# Patient Record
Sex: FEMALE | Race: WHITE | Employment: OTHER | URBAN - NONMETROPOLITAN AREA
[De-identification: names, ages, dates, MRNs, and addresses within clinical notes are randomized per-mention and may not be internally consistent; named-entity substitution may affect disease eponyms.]

---

## 2017-11-27 ENCOUNTER — HOSPITAL ENCOUNTER (OUTPATIENT)
Dept: WOMENS IMAGING | Age: 67
Discharge: HOME | End: 2017-11-27
Payer: MEDICARE

## 2017-11-27 DIAGNOSIS — Z13.9 VISIT FOR SCREENING: ICD-10-CM

## 2017-11-27 PROCEDURE — G0202 SCR MAMMO BI INCL CAD: HCPCS

## 2017-11-29 ENCOUNTER — HOSPITAL ENCOUNTER (OUTPATIENT)
Dept: WOMENS IMAGING | Age: 67
Discharge: HOME | End: 2017-11-29
Payer: MEDICARE

## 2017-11-29 ENCOUNTER — APPOINTMENT (OUTPATIENT)
Dept: WOMENS IMAGING | Age: 67
End: 2017-11-29
Payer: MEDICARE

## 2017-11-29 DIAGNOSIS — R92.2 BREAST DENSITY: ICD-10-CM

## 2017-11-29 PROCEDURE — G0279 TOMOSYNTHESIS, MAMMO: HCPCS

## 2018-01-09 ENCOUNTER — HOSPITAL ENCOUNTER (OUTPATIENT)
Dept: ULTRASOUND IMAGING | Age: 68
Discharge: HOME | End: 2018-01-09
Payer: MEDICARE

## 2018-01-09 DIAGNOSIS — R52 PAIN: ICD-10-CM

## 2018-01-09 PROCEDURE — 76856 US EXAM PELVIC COMPLETE: CPT

## 2018-05-30 ENCOUNTER — HOSPITAL ENCOUNTER (OUTPATIENT)
Dept: WOMENS IMAGING | Age: 68
Discharge: HOME | End: 2018-05-30
Payer: MEDICARE

## 2018-05-30 DIAGNOSIS — R92.2 BREAST DENSITY: ICD-10-CM

## 2018-05-30 PROCEDURE — G0279 TOMOSYNTHESIS, MAMMO: HCPCS

## 2018-11-28 ENCOUNTER — HOSPITAL ENCOUNTER (OUTPATIENT)
Dept: WOMENS IMAGING | Age: 68
Discharge: HOME | End: 2018-11-28
Payer: MEDICARE

## 2018-11-28 DIAGNOSIS — Z12.31 VISIT FOR SCREENING MAMMOGRAM: ICD-10-CM

## 2018-11-28 PROCEDURE — 77063 BREAST TOMOSYNTHESIS BI: CPT

## 2019-04-19 ENCOUNTER — HOSPITAL ENCOUNTER (OUTPATIENT)
Dept: CT IMAGING | Age: 69
Discharge: HOME | End: 2019-04-19
Payer: MEDICARE

## 2019-04-19 DIAGNOSIS — N02.9 IDIOPATHIC HEMATURIA, UNSPECIFIED WHETHER GLOMERULAR MORPHOLOGIC CHANGES PRESENT: ICD-10-CM

## 2019-04-19 PROCEDURE — 74176 CT ABD & PELVIS W/O CONTRAST: CPT

## 2019-11-29 ENCOUNTER — HOSPITAL ENCOUNTER (OUTPATIENT)
Dept: MAMMOGRAPHY | Age: 69
Discharge: HOME | End: 2019-11-29
Payer: MEDICARE

## 2019-11-29 DIAGNOSIS — Z12.39 BREAST SCREENING: ICD-10-CM

## 2019-11-29 PROCEDURE — 77063 BREAST TOMOSYNTHESIS BI: CPT

## 2020-01-20 ENCOUNTER — HOSPITAL ENCOUNTER (OUTPATIENT)
Age: 70
Discharge: HOME | End: 2020-01-20
Payer: MEDICARE

## 2020-01-20 LAB
ANION GAP SERPL CALCULATED.3IONS-SCNC: 11 MEQ/L (ref 8–16)
BUN BLDV-MCNC: 9 MG/DL (ref 7–22)
CALCIUM SERPL-MCNC: 9.2 MG/DL (ref 8.5–10.5)
CHLORIDE BLD-SCNC: 95 MEQ/L (ref 98–111)
CO2: 28 MEQ/L (ref 23–33)
CREAT SERPL-MCNC: 0.7 MG/DL (ref 0.4–1.2)
EKG ATRIAL RATE: 73 BPM
EKG P AXIS: 51 DEGREES
EKG P-R INTERVAL: 150 MS
EKG Q-T INTERVAL: 390 MS
EKG QRS DURATION: 80 MS
EKG QTC CALCULATION (BAZETT): 429 MS
EKG R AXIS: 39 DEGREES
EKG T AXIS: 73 DEGREES
EKG VENTRICULAR RATE: 73 BPM
ERYTHROCYTE [DISTWIDTH] IN BLOOD BY AUTOMATED COUNT: 13 % (ref 11.5–14.5)
ERYTHROCYTE [DISTWIDTH] IN BLOOD BY AUTOMATED COUNT: 46.9 FL (ref 35–45)
GFR SERPL CREATININE-BSD FRML MDRD: 82 ML/MIN/1.73M2
GLUCOSE BLD-MCNC: 93 MG/DL (ref 70–108)
HCT VFR BLD CALC: 42.1 % (ref 37–47)
HEMOGLOBIN: 13.1 GM/DL (ref 12–16)
MCH RBC QN AUTO: 30.7 PG (ref 26–33)
MCHC RBC AUTO-ENTMCNC: 31.1 GM/DL (ref 32.2–35.5)
MCV RBC AUTO: 98.6 FL (ref 81–99)
PLATELET # BLD: 250 THOU/MM3 (ref 130–400)
PMV BLD AUTO: 11.5 FL (ref 9.4–12.4)
POTASSIUM SERPL-SCNC: 4.3 MEQ/L (ref 3.5–5.2)
RBC # BLD: 4.27 MILL/MM3 (ref 4.2–5.4)
SODIUM BLD-SCNC: 134 MEQ/L (ref 135–145)
WBC # BLD: 7.4 THOU/MM3 (ref 4.8–10.8)

## 2020-01-20 PROCEDURE — 36415 COLL VENOUS BLD VENIPUNCTURE: CPT

## 2020-01-20 PROCEDURE — 85027 COMPLETE CBC AUTOMATED: CPT

## 2020-01-20 PROCEDURE — 80048 BASIC METABOLIC PNL TOTAL CA: CPT

## 2020-01-20 PROCEDURE — 93005 ELECTROCARDIOGRAM TRACING: CPT | Performed by: PHYSICIAN ASSISTANT

## 2020-02-10 ENCOUNTER — HOSPITAL ENCOUNTER (OUTPATIENT)
Age: 70
Setting detail: OUTPATIENT SURGERY
Discharge: HOME | End: 2020-02-10
Attending: SPECIALIST | Admitting: SPECIALIST
Payer: MEDICARE

## 2020-02-10 ENCOUNTER — ANESTHESIA EVENT (OUTPATIENT)
Dept: OPERATING ROOM | Age: 70
End: 2020-02-10
Payer: MEDICARE

## 2020-02-10 ENCOUNTER — ANESTHESIA (OUTPATIENT)
Dept: OPERATING ROOM | Age: 70
End: 2020-02-10
Payer: MEDICARE

## 2020-02-10 VITALS
HEIGHT: 61 IN | RESPIRATION RATE: 16 BRPM | DIASTOLIC BLOOD PRESSURE: 62 MMHG | BODY MASS INDEX: 32.28 KG/M2 | TEMPERATURE: 97 F | OXYGEN SATURATION: 99 % | SYSTOLIC BLOOD PRESSURE: 139 MMHG | WEIGHT: 171 LBS | HEART RATE: 70 BPM

## 2020-02-10 VITALS
RESPIRATION RATE: 16 BRPM | OXYGEN SATURATION: 99 % | SYSTOLIC BLOOD PRESSURE: 131 MMHG | DIASTOLIC BLOOD PRESSURE: 62 MMHG

## 2020-02-10 PROCEDURE — 2500000003 HC RX 250 WO HCPCS: Performed by: SPECIALIST

## 2020-02-10 PROCEDURE — 2500000003 HC RX 250 WO HCPCS: Performed by: NURSE ANESTHETIST, CERTIFIED REGISTERED

## 2020-02-10 PROCEDURE — 6360000002 HC RX W HCPCS: Performed by: SPECIALIST

## 2020-02-10 PROCEDURE — 3600000013 HC SURGERY LEVEL 3 ADDTL 15MIN: Performed by: SPECIALIST

## 2020-02-10 PROCEDURE — 3700000000 HC ANESTHESIA ATTENDED CARE: Performed by: SPECIALIST

## 2020-02-10 PROCEDURE — 7100000011 HC PHASE II RECOVERY - ADDTL 15 MIN: Performed by: SPECIALIST

## 2020-02-10 PROCEDURE — 2580000003 HC RX 258: Performed by: SPECIALIST

## 2020-02-10 PROCEDURE — 7100000010 HC PHASE II RECOVERY - FIRST 15 MIN: Performed by: SPECIALIST

## 2020-02-10 PROCEDURE — 3600000003 HC SURGERY LEVEL 3 BASE: Performed by: SPECIALIST

## 2020-02-10 PROCEDURE — 88305 TISSUE EXAM BY PATHOLOGIST: CPT

## 2020-02-10 PROCEDURE — 88331 PATH CONSLTJ SURG 1 BLK 1SPC: CPT

## 2020-02-10 PROCEDURE — 3700000001 HC ADD 15 MINUTES (ANESTHESIA): Performed by: SPECIALIST

## 2020-02-10 RX ORDER — SODIUM CHLORIDE 9 MG/ML
INJECTION, SOLUTION INTRAVENOUS CONTINUOUS
Status: DISCONTINUED | OUTPATIENT
Start: 2020-02-10 | End: 2020-02-10 | Stop reason: HOSPADM

## 2020-02-10 RX ORDER — LIDOCAINE HYDROCHLORIDE 20 MG/ML
INJECTION, SOLUTION EPIDURAL; INFILTRATION; INTRACAUDAL; PERINEURAL PRN
Status: DISCONTINUED | OUTPATIENT
Start: 2020-02-10 | End: 2020-02-10 | Stop reason: SDUPTHER

## 2020-02-10 RX ORDER — PROPOFOL 10 MG/ML
INJECTION, EMULSION INTRAVENOUS CONTINUOUS PRN
Status: DISCONTINUED | OUTPATIENT
Start: 2020-02-10 | End: 2020-02-10 | Stop reason: SDUPTHER

## 2020-02-10 RX ORDER — LIDOCAINE HYDROCHLORIDE AND EPINEPHRINE 10; 10 MG/ML; UG/ML
INJECTION, SOLUTION INFILTRATION; PERINEURAL PRN
Status: DISCONTINUED | OUTPATIENT
Start: 2020-02-10 | End: 2020-02-10 | Stop reason: ALTCHOICE

## 2020-02-10 RX ADMIN — CEFAZOLIN 2 G: 10 INJECTION, POWDER, FOR SOLUTION INTRAVENOUS at 09:45

## 2020-02-10 RX ADMIN — LIDOCAINE HYDROCHLORIDE 40 MG: 20 INJECTION, SOLUTION EPIDURAL; INFILTRATION; INTRACAUDAL; PERINEURAL at 09:55

## 2020-02-10 RX ADMIN — SODIUM CHLORIDE: 9 INJECTION, SOLUTION INTRAVENOUS at 09:45

## 2020-02-10 RX ADMIN — PROPOFOL 100 MCG/KG/MIN: 10 INJECTION, EMULSION INTRAVENOUS at 09:45

## 2020-02-10 RX ADMIN — LIDOCAINE HYDROCHLORIDE 40 MG: 20 INJECTION, SOLUTION EPIDURAL; INFILTRATION; INTRACAUDAL; PERINEURAL at 09:45

## 2020-02-10 ASSESSMENT — PULMONARY FUNCTION TESTS
PIF_VALUE: 0

## 2020-02-10 ASSESSMENT — PAIN SCALES - GENERAL: PAINLEVEL_OUTOF10: 0

## 2020-02-10 ASSESSMENT — PAIN - FUNCTIONAL ASSESSMENT: PAIN_FUNCTIONAL_ASSESSMENT: 0-10

## 2020-02-10 NOTE — H&P
[address] [id number]                       PREOPERATIVE HISTORY AND PHYSICAL    PATIENT NAME: Messi Mckeon             :        1950  MED REC NO:   [id number]                           ROOM:  ACCOUNT NO:   [de-identified]                           ADMIT DATE: 02/10/2020  PROVIDER:     TIM Neville ATTENDING PROVIDER:  Cheko Bedolla. Rosenda Glasgow MD    HISTORY OF PRESENT ILLNESS:  The patient is a 77-year-old female who was  referred to our office by Dr. Danny Pink. 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  This lesion has been present  since 2019 and has been gradually enlarging prior to the biopsy. She  was referred to our office for definitive surgical intervention. ALLERGIES:  LIPITOR, PERCOCET, TYLOX. PAST MEDICAL HISTORY:  Hypertension, hyperlipidemia, skin cancer. PAST SURGICAL HISTORY:  Hysterectomy and previous skin cancer excisions. SOCIAL HISTORY:  She denies any tobacco use. MEDICATIONS:  Zoloft, potassium, calcium, Tylenol, ferrous sulfate,  multivitamin, simvastatin, Prinivil. REVIEW OF SYSTEMS:  CONSTITUTIONAL:  Denies any fatigue or fever. GASTROINTESTINAL:  Denies any nausea or vomiting. CARDIOVASCULAR:  Denies any chest pain. INTEGUMENTARY:  Reports the lesion on her right mid chest.    PHYSICAL EXAMINATION:  GENERAL:  The patient is a pleasant,  well-developed, well-nourished,  alert and oriented x1 77-year-old female who is in no acute distress. VITAL SIGNS:  She is 5 feet 1 inch tall, weighs 170 pounds, body mass  index 32.2. HEART:  Auscultation of her heart reveals regular rate and rhythm. No  murmur. LUNGS:  Auscultation of her lungs show that they are clear throughout  all lung fields.   INTEGUMENTARY:  Reveals 9 mm biopsy site over the right mid chest.    DIAGNOSIS:  Squamous cell carcinoma of the right mid chest.    PLAN:  We discussed with the patient the nature of her problem. I  discussed with her I would recommend definitive surgical intervention to  excise the squamous cell carcinoma. Frozen section, pathologic review,  would be obtained. Definitive closure would be made after the skin  cancer was excised. I discussed the operative procedure as well as  postop recovery with her. All of her questions have been answered to  her satisfaction. An operative date of 02/10/2020 has been chosen at  [address] [place] [address].         TIM Mello    D: 02/10/2020 7:19:34       T: 02/10/2020 7:57:47     [email or web address]  Job#: [id number]     Doc#: [id number]    CC:

## 2020-02-10 NOTE — ANESTHESIA PRE PROCEDURE
Department of Anesthesiology  Preprocedure Note       Name:  Hugo Piper   Age:  68 y.o.  :  1950                                          MRN:  [id number]         Date:  2/10/2020      Surgeon: Melia Valero):  Cheryl Dodge MD    Procedure: EXCISION SCC RIGHT MID CHEST (Right )    Medications prior to admission:   Prior to Admission medications    Medication Sig Start Date End Date Taking? Authorizing Provider   sertraline (ZOLOFT) 25 MG tablet Take 25 mg by mouth daily   Yes Historical Provider, MD   Potassium 99 MG TABS Take 1 tablet by mouth daily   Yes Historical Provider, MD   Calcium Carbonate-Vitamin D (CALCIUM 600+D HIGH POTENCY PO) Take 1 tablet by mouth daily   Yes Historical Provider, MD   acetaminophen (APAP EXTRA STRENGTH) 500 MG tablet Take 2 tablets by mouth every 6 hours as needed for Pain 16  Yes VASU Casper - CNP   simvastatin (ZOCOR) 20 MG tablet Take 20 mg by mouth nightly Stopped last week because of muscle weakness   Yes Historical Provider, MD   lisinopril (PRINIVIL;ZESTRIL) 10 MG tablet Take 10 mg by mouth daily. Yes Historical Provider, MD   ferrous sulfate 325 (65 FE) MG tablet Take 325 mg by mouth daily (with breakfast). Historical Provider, MD   Multiple Vitamin (MULTI-VITAMIN PO) Take  by mouth. Historical Provider, MD       Current medications:    Current Facility-Administered Medications   Medication Dose Route Frequency Provider Last Rate Last Dose    0.9 % sodium chloride infusion   Intravenous Continuous Cheryl Dodge MD        ceFAZolin (ANCEF) 2 g in dextrose 5 % 50 mL IVPB  2 g Intravenous On Call to 56 Ladi Arce MD           Allergies:     Allergies   Allergen Reactions    Lipitor Other (See Comments)     WEAK MUSCLES    Oxycodone-Acetaminophen Other (See Comments)     HALLUCINATIONS    Tylox [Oxycodone-Acetaminophen] Other (See Comments)     hallucinations       Problem List:    Patient Active Problem List   Diagnosis 08/09/2014       POC Tests: No results for input(s): POCGLU, POCNA, POCK, POCCL, POCBUN, POCHEMO, POCHCT in the last 72 hours. Coags: No results found for: PROTIME, INR, APTT    HCG (If Applicable): No results found for: PREGTESTUR, PREGSERUM, HCG, HCGQUANT     ABGs: No results found for: PHART, PO2ART, WHO7QTG, PZI0UBB, BEART, A4PUIDJY     Type & Screen (If Applicable):  No results found for: LABABO, [address]    Anesthesia Evaluation  Patient summary reviewed no history of anesthetic complications:   Airway: Mallampati: II  TM distance: >3 FB   Neck ROM: full  Mouth opening: > = 3 FB Dental:          Pulmonary:Negative Pulmonary ROS and normal exam                               Cardiovascular:    (+) hypertension:, hyperlipidemia                  Neuro/Psych:   Negative Neuro/Psych ROS              GI/Hepatic/Renal: Neg GI/Hepatic/Renal ROS            Endo/Other:    (+) malignancy/cancer. Abdominal:           Vascular: negative vascular ROS. Anesthesia Plan      MAC     ASA 2       Induction: intravenous. Anesthetic plan and risks discussed with patient.       Plan discussed with CRNA and surgical team.                  Nakita Olmos MD   2/10/2020

## 2020-02-27 ENCOUNTER — HOSPITAL ENCOUNTER (OUTPATIENT)
Age: 70
Discharge: HOME | End: 2020-02-27
Payer: MEDICARE

## 2020-02-27 ENCOUNTER — HOSPITAL ENCOUNTER (OUTPATIENT)
Dept: GENERAL RADIOLOGY | Age: 70
Discharge: HOME | End: 2020-02-27
Payer: MEDICARE

## 2020-02-27 PROCEDURE — 73564 X-RAY EXAM KNEE 4 OR MORE: CPT

## 2020-03-04 ENCOUNTER — HOSPITAL ENCOUNTER (OUTPATIENT)
Dept: PHYSICAL THERAPY | Age: 70
Setting detail: THERAPIES SERIES
Discharge: HOME | End: 2020-03-04
Payer: MEDICARE

## 2020-03-04 PROCEDURE — 97161 PT EVAL LOW COMPLEX 20 MIN: CPT

## 2020-03-04 PROCEDURE — 97035 APP MDLTY 1+ULTRASOUND EA 15: CPT

## 2020-03-04 PROCEDURE — 97110 THERAPEUTIC EXERCISES: CPT

## 2020-03-04 ASSESSMENT — PAIN DESCRIPTION - ORIENTATION: ORIENTATION: RIGHT

## 2020-03-04 ASSESSMENT — PAIN DESCRIPTION - LOCATION: LOCATION: KNEE

## 2020-03-04 ASSESSMENT — PAIN SCALES - GENERAL: PAINLEVEL_OUTOF10: 7

## 2020-03-04 NOTE — FLOWSHEET NOTE
** PLEASE SIGN, DATE AND TIME CERTIFICATION BELOW AND RETURN TO [facility] OUTPATIENT REHABILITATION (FAX #: [phone]). ATTEST/CO-SIGN IF ACCESSING VIA [employer]. THANK YOU.**    I certify that I have examined the patient below and determined that Physical Medicine and Rehabilitation service is necessary and that I approve the established plan of care for up to 90 days or as specifically noted. Attestation, signature or co-signature of physician indicates approval of certification requirements.    ________________________ ____________ __________  Physician Signature   Date   Time       [address]     Time In: 08  Time Out: 6314  Minutes: 50  Timed Code Treatment Minutes: 10 Minutes           AROM R knee 3- 125 degrees    Date: 3/4/2020  Patient Name: Ana Norton,  Gender:  female        CSN: [id number]   : 1950  (68 y.o.)  Referral Date : 20     Referring Practitioner: Dr. George Harding      Diagnosis: R knee pain  Treatment Diagnosis: R knee pain, difficulty walking  Additional Pertinent Hx: see medical history quesionnaire , reviewed meds and allergies       General:  PT Visit Information  Onset Date: 20  PT Insurance Information: Medicare- pays at 80%, modalities covered except ionto/MHP/CP not covered. Secondary: Aetna  Total # of Visits Approved: 10  Total # of Visits to Date: 1  Plan of Care/Certification Expiration Date: 04/10/20  Progress Note Due Date: 20  Progress Note Counter: 1/10                Position Activity Restriction  Other position/activity restrictions: none       See Medical History Questionnaire for information related to allergies and medications. Subjective:  Chart Reviewed: Yes  Patient assessed for rehabilitation services?: Yes  Response To Previous Treatment: Not applicable  Family / Caregiver Present: No  Comments: 1/10 for PN. cert due .   f/u with MD 3/26     Subjective: patient reports Novemeber 2019, kneeling to get into bed and felt severe, sharp pain medial R knee. Pain since then has been present and mentioned at wellness check on 2/27, x-ray were ordered, PT ordered. Pain:  Patient Currently in Pain: Yes  Pain Assessment: 0-10  Pain Level: 7  Pain Location: Knee  Pain Orientation: Right  Pain Radiating Towards: medial R knee and posterior knee 100% of the day, high 7/10 , average 4/10 with fast walking     Social/Functional History:    Type of Home: House  Home Layout: One level  Home Access: Stairs to enter with rails  Entrance Stairs - Number of Steps: 2  Entrance Stairs - Rails: Right  Home Equipment: Rolling walker, Cane(does not use assistive device)     Bathroom Shower/Tub: Walk-in shower  Bathroom Equipment: Grab bars around toilet  IADL Comments: has to work slower at [employer], difficulty walking uneven surface. pain sit to stand 2/10       ADL Assistance: [address] [place] [address] Avenue: Independent  Homemaking Responsibilities: Yes  Ambulation Assistance: Independent  Transfer Assistance: Independent    Active : Yes  Mode of Transportation: Car  Occupation: Retired  Leisure & Hobbies: enjoys reading.   helping with brother in nursing home- brings him to /from appointments, does not have to assist with transfers and walking  Additional Comments: increased R knee pain with walking from car to store at fast pace, difficulty going up and down steps at [employer], has to walk slower pace    Objective  Overall Orientation Status: Within Normal Limits             Vision: Within Functional Limits    Hearing: Within functional limits      Observation/Palpation  Posture: Fair                                                   ROM RLE: AROM R hip flexion 60, abduction 10, knee 3- 125 degrees  ROM LLE: AROM L hip flexion 70, abduction 15, knee 0- 135 degrees    Strength RLE: Exception  R Hip Flexion: 3/5  R Hip

## 2020-03-11 ENCOUNTER — HOSPITAL ENCOUNTER (OUTPATIENT)
Dept: PHYSICAL THERAPY | Age: 70
Setting detail: THERAPIES SERIES
Discharge: HOME | End: 2020-03-11
Payer: MEDICARE

## 2020-03-11 PROCEDURE — 97110 THERAPEUTIC EXERCISES: CPT

## 2020-03-11 NOTE — PROGRESS NOTES
[address] PHYSICAL THERAPY  DAILY NOTE  [address] [facility].     Time In: 3444  Time Out: 0932  Minutes: 25  Timed Code Treatment Minutes: 20 Minutes                Date: 3/11/2020  Patient Name: Cayla Tavera,  Gender:  female        CSN: [id number]   : 1950  (68 y.o.)  Referral Date : 20    Referring Practitioner: Dr. Dom Guillaume      Diagnosis: R knee pain  Treatment Diagnosis: R knee pain, difficulty walking   Additional Pertinent Hx: see medical history quesionnaire , reviewed meds and allergies                  General:  PT Visit Information  Onset Date: 20  PT Insurance Information: Medicare- pays at 80%, modalities covered except ionto/MHP/CP not covered. Secondary: Aetna  Total # of Visits Approved: 10  Total # of Visits to Date: 2  Plan of Care/Certification Expiration Date: 04/10/20  Progress Note Counter: 2/10 for PN. Subjective:  Family / Caregiver Present: No  Comments: 1/10 for PN. cert due . f/u with MD 3/26     Subjective: Patient reporting having no pain today and reporting steps still bother good. Pain:  Patient Currently in Pain: No         Objective  Exercises  Exercise 1: R LE exercises  Exercise 2: heel slides x 10 - denies increase in pain  Exercise 3: quad set pillow under knee 15 x 5 seconds  Exercise 4: SAQ 10 x 5 seconds   Exercise 5: calf stretch with strap 10-15 seconds x 5 on Right   Exercise 6: SLR 10 x 5 seconds   Exercise 7: LAQ 10 x 5 seconds   Exercise 8: knee flexion EOB 10 x 5 seconds  Exercise 9: seated hamstring stretch 10-15 seconds x 5   Exercise 10: phonophoresis 50% x 5 minutes to medial knee, pillow under knee 2.5% hydrocortisone cream         Activity Tolerance:  Activity Tolerance: Patient Tolerated treatment well    Assessment:   Body structures, Functions, Activity limitations: Decreased ROM, Decreased strength  Assessment: Made progressions as noted with patient having less pain today. Patient reporting only minimal increase in soreness at end of session but having no other complains. Prognosis: Excellent       Patient Education:continue with HEP and monitor response to progressions. Plan:  Times per week: 2 x per week  Plan weeks: 4 weeks  Specific instructions for Next Treatment: Bike/NuStep, AROM, strengthening, stretching, avoid increased medial knee pain, progress to standing, total gym as tolerated.   phonophoresis medial R knee prn  Current Treatment Recommendations: Strengthening, ROM, Home Exercise Program, Patient/Caregiver Education & Training    Goals:  Patient goals : to get my knee feeling better    Short term goals  Time Frame for Short term goals: deferred to [facility]'s    Long term goals  Time Frame for Long term goals : 5 weeks   Long term goal 1: increased AROM R hip flexion 70, abduction 15, knee 0- 135 degrees to allow patient to report able to sit to stand with no pain  Long term goal 2: increase strength R LE to 4-/5 to allow patient to walk fast in/out of store with decreased R knee pain to 1/10  Long term goal 3: I with HEP as prescribed to allow patient to report able to walk x 30 minutes at store with decreased R knee pain to 1/10    Nimisha Holbrook, [id number]

## 2020-03-13 ENCOUNTER — HOSPITAL ENCOUNTER (OUTPATIENT)
Dept: PHYSICAL THERAPY | Age: 70
Setting detail: THERAPIES SERIES
Discharge: HOME | End: 2020-03-13
Payer: MEDICARE

## 2020-03-13 PROCEDURE — 97110 THERAPEUTIC EXERCISES: CPT

## 2020-03-13 NOTE — PROGRESS NOTES
[email or web address] PHYSICAL THERAPY  DAILY NOTE  [address] [facility].     Time In: 6241  Time Out: 09  Minutes: 30  Timed Code Treatment Minutes: 30 Minutes                Date: 3/13/2020  Patient Name: Julian Wood,  Gender:  female        CSN: [id number]   : 1950  (68 y.o.)  Referral Date : 20    Referring Practitioner: Dr. Marilyn Cardoso      Diagnosis: R knee pain  Treatment Diagnosis: R knee pain, difficulty walking   Additional Pertinent Hx: see medical history quesionnaire , reviewed meds and allergies                  General:  PT Visit Information  Onset Date: 20  PT Insurance Information: Medicare- pays at 80%, modalities covered except ionto/MHP/CP not covered. Secondary: Aetna  Total # of Visits Approved: 10  Total # of Visits to Date: 3  Plan of Care/Certification Expiration Date: 04/10/20  Progress Note Counter: 3/10 for PN. Subjective:  Family / Caregiver Present: No  Comments: 1/10 for PN. cert due . f/u with MD 3/26     Subjective: No pain today but did have pain yesterday when walking around mall. Pain:  Patient Currently in Pain: No         Objective  Exercises  Exercise 1: Bike seat 3 x 3 minutes -no inceace in pain  Exercise 2: heel slides x 15 - denies increase in pain  Exercise 3: quad set pillow under knee 15 x 5 seconds  Exercise 4: SAQ 15 x 5 seconds   Exercise 5: calf stretch with strap 10-15 seconds x 5 on Right   Exercise 6: SLR 10 x 5 seconds   Exercise 7: LAQ 15 x 5 seconds   Exercise 8: knee flexion EOB 10 x 5 seconds  Exercise 9: seated hamstring stretch 10-15 seconds x 5   Exercise 10: 3 way hip x 10 bilat   Exercise 11: total gym x 15          Activity Tolerance:  Activity Tolerance: Patient Tolerated treatment well    Assessment:   Body structures, Functions, Activity limitations: Decreased ROM, Decreased strength  Assessment: Progressed with reps as noted with patient tolerating well but reporting a little more knee soreness at end of session but having no other complains. Prognosis: Excellent       Patient Education:monitor response to progressions. Plan:  Times per week: 2 x per week  Plan weeks: 4 weeks  Specific instructions for Next Treatment: Bike/NuStep, AROM, strengthening, stretching, avoid increased medial knee pain, progress to standing, total gym as tolerated.   phonophoresis medial R knee prn  Current Treatment Recommendations: Strengthening, ROM, Home Exercise Program, Patient/Caregiver Education & Training    Goals:  Patient goals : to get my knee feeling better    Short term goals  Time Frame for Short term goals: deferred to LTG's    Long term goals  Time Frame for Long term goals : 5 weeks   Long term goal 1: increased AROM R hip flexion 70, abduction 15, knee 0- 135 degrees to allow patient to report able to sit to stand with no pain  Long term goal 2: increase strength R LE to 4-/5 to allow patient to walk fast in/out of store with decreased R knee pain to 1/10  Long term goal 3: I with HEP as prescribed to allow patient to report able to walk x 30 minutes at store with decreased R knee pain to 1/10    Benton Strickland, [id number]

## 2020-03-18 ENCOUNTER — APPOINTMENT (OUTPATIENT)
Dept: PHYSICAL THERAPY | Age: 70
End: 2020-03-18
Payer: MEDICARE

## 2020-03-20 ENCOUNTER — APPOINTMENT (OUTPATIENT)
Dept: PHYSICAL THERAPY | Age: 70
End: 2020-03-20
Payer: MEDICARE

## 2020-03-25 ENCOUNTER — APPOINTMENT (OUTPATIENT)
Dept: PHYSICAL THERAPY | Age: 70
End: 2020-03-25
Payer: MEDICARE

## 2020-03-27 ENCOUNTER — APPOINTMENT (OUTPATIENT)
Dept: PHYSICAL THERAPY | Age: 70
End: 2020-03-27
Payer: MEDICARE

## 2020-06-04 ENCOUNTER — HOSPITAL ENCOUNTER (OUTPATIENT)
Dept: PHYSICAL THERAPY | Age: 70
Setting detail: THERAPIES SERIES
Discharge: HOME | End: 2020-06-04
Payer: MEDICARE

## 2020-06-04 NOTE — DISCHARGE SUMMARY
[facility] NOTE  OUTPATIENT  [address] [facility].    Patient Name: Avery Lopez        CSN: [id number]   YOB: 1950  Gender: female          Patient is discharged from Physical Therapy services at this time. See last note for details related to results of therapy and goal achievement. Reason for discharge: was cancelled per hospital COVID restrictions, called to see if would like to return and patient would like to be discharged. Ayesha Drummond.  Calvin Segovia # [id number]

## 2020-09-17 ENCOUNTER — HOSPITAL ENCOUNTER (OUTPATIENT)
Dept: PHYSICAL THERAPY | Age: 70
Setting detail: THERAPIES SERIES
Discharge: HOME | End: 2020-09-17
Payer: MEDICARE

## 2020-09-17 PROCEDURE — 97161 PT EVAL LOW COMPLEX 20 MIN: CPT

## 2020-09-17 PROCEDURE — 97110 THERAPEUTIC EXERCISES: CPT

## 2020-09-17 NOTE — FLOWSHEET NOTE
** PLEASE SIGN, DATE AND TIME CERTIFICATION BELOW AND RETURN TO [facility] OUTPATIENT REHABILITATION (FAX #: [phone]). ATTEST/CO-SIGN IF ACCESSING VIA [employer]. THANK YOU.**    I certify that I have examined the patient below and determined that Physical Medicine and Rehabilitation service is necessary and that I approve the established plan of care for up to 90 days or as specifically noted. Attestation, signature or co-signature of physician indicates approval of certification requirements.    ________________________ ____________ __________  Physician Signature   Date   Time   [address] [facility]  PHYSICAL THERAPY  [x] EVALUATION  [] DAILY NOTE (LAND) [] DAILY NOTE (AQUATIC ) [] PROGRESS NOTE [] DISCHARGE NOTE    [] [facility]   [] [facility] 90    [] [facility]   [] [facility]    Date: 2020  Patient Name:  Negar Fulton  : 1950  MRN: [id number]    Referring Practitioner Marychuy Aquino MD   Diagnosis Other tear of medial meniscus, current injury, right knee, subsequent encounter [I50.570N]    Treatment Diagnosis R knee pain, difficulty walking   Date of Evaluation 20   Additional Pertinent History See below      Functional Outcome Measure Used LEFS   Functional Outcome Score  (20) eval score 8      Insurance: Primary: Payor: MEDICARE /  /  / ,   Secondary: Danielle Agosto Information: Medicare- pays at 80%, modalities covered except ionto/MHP/CP not covered. Visit # 1, 1/10 for progress note   Visits Allowed: Unlimited visits   Recertification Date:    Physician Follow-Up: 2020   Physician Orders: PT per protocol   History of Present Illness: See below     SUBJECTIVE: 2019 felt a pop in knee when getting into bed. Patient did OP PT in March but then pain continued.   Patient to Dr. Argelia Alberts in 2020, MRI, had R knee scope on 9/15/2020, home same day with AROM, stretching, strengthening R LE , progress to standing , step, total gym    Activity/Treatment Tolerance:  []  Patient tolerated treatment well  []  Patient limited by fatigue  [x]  Patient limited by pain   []  Patient limited by medical complications  []  Other:     Assessment: PT for AROM, strength, gait and balance s/p R knee scope. Body Structures/Functions/Activity Limitations: impaired ROM, impaired strength, pain and abnormal gait  Prognosis: good    GOALS:  Patient Goal: to get to walking without walker    Short Term Goals:  Time Frame: deferred to LTG's    Long Term Goals:  Time Frame: 5 weeks  1. Increase AROM R hip flexion 70, knee 0- 125 degrees to allow patient to walk and stand x 15 minutes for meal prep without assistive device  2. Increase strength R LE to 4-/5 to allow patient to go up and down 2 steps without assistive device  3. I with HEP as prescribed to allow patient to be I with dressing, showering    Patient Education:   [x]  HEP/Education Completed: Plan of Care, Goals, HEP handout of above exercises     []  No new Education completed  []  Reviewed Prior HEP      [x]  Patient verbalized and/or demonstrated understanding of education provided. []  Patient unable to verbalize and/or demonstrate understanding of education provided. Will continue education. [x]  Barriers to learning: NA    PLAN:  Treatment Recommendations: Strengthening, Range of Motion, Balance Training, Gait Training, Home Exercise Program and Patient Education    [x]  Plan of care initiated. Plan to see patient 2 times per week for 5 weeks to address the treatment planned outlined above.   []  Continue with current plan of care  []  Modify plan of care as follows:    []  Hold pending physician visit  []  Discharge    Time In 1310   Time Out 1400   Timed Code Minutes: 25 min   Total Treatment Time: 50  min       Electronically Signed by: Giselle Gamboa

## 2020-09-22 ENCOUNTER — HOSPITAL ENCOUNTER (OUTPATIENT)
Dept: PHYSICAL THERAPY | Age: 70
Setting detail: THERAPIES SERIES
Discharge: HOME | End: 2020-09-22
Payer: MEDICARE

## 2020-09-22 PROCEDURE — 97110 THERAPEUTIC EXERCISES: CPT

## 2020-09-22 NOTE — PROGRESS NOTES
Interventions Next Treatment: Bike, AROM, stretching, strengthening R LE , progress to standing , step, total gym    Activity/Treatment Tolerance:  [x]  Patient tolerated treatment well  []  Patient limited by fatigue  []  Patient limited by pain   []  Patient limited by medical complications  []  Other:     Assessment: Pt doing well after recent R knee surgery. Decreased pain after session. Pt to continue with HEP and ice for pain relief. Body Structures/Functions/Activity Limitations: impaired ROM, impaired strength, pain and abnormal gait  Prognosis: good    GOALS:  Patient Goal: to get to walking without walker    Short Term Goals:  Time Frame: deferred to LTG's    Long Term Goals:  Time Frame: 5 weeks  1. Increase AROM R hip flexion 70, knee 0- 125 degrees to allow patient to walk and stand x 15 minutes for meal prep without assistive device  2. Increase strength R LE to 4-/5 to allow patient to go up and down 2 steps without assistive device  3. I with HEP as prescribed to allow patient to be I with dressing, showering    Patient Education:   [x]  HEP/Education Completed: LAQ given to add to HEP     []  No new Education completed  []  Reviewed Prior HEP      [x]  Patient verbalized and/or demonstrated understanding of education provided. []  Patient unable to verbalize and/or demonstrate understanding of education provided. Will continue education. [x]  Barriers to learning: NA    PLAN:  Treatment Recommendations: Strengthening, Range of Motion, Balance Training, Gait Training, Home Exercise Program and Patient Education    []  Plan of care initiated. Plan to see patient 2 times per week for 5 weeks to address the treatment planned outlined above.   [x]  Continue with current plan of care  []  Modify plan of care as follows:    []  Hold pending physician visit  []  Discharge    Time In 1405   Time Out 1435   Timed Code Minutes: 30 min   Total Treatment Time: 30  min       Electronically Signed by: Adalid Lowe

## 2020-09-24 ENCOUNTER — HOSPITAL ENCOUNTER (OUTPATIENT)
Dept: PHYSICAL THERAPY | Age: 70
Setting detail: THERAPIES SERIES
Discharge: HOME | End: 2020-09-24
Payer: MEDICARE

## 2020-09-24 PROCEDURE — 97110 THERAPEUTIC EXERCISES: CPT

## 2020-09-24 NOTE — PROGRESS NOTES
[address] [facility]  PHYSICAL THERAPY  [] EVALUATION  [x] DAILY NOTE (LAND) [] DAILY NOTE (AQUATIC ) [] PROGRESS NOTE [] DISCHARGE NOTE    [] [address] [facility]   [x] [address]    [] [facility]   [] Jayson Quigley    Date: 2020  Patient Name:  Massiel Curtis  : 1950  MRN: [id number]    Referring Practitioner Lennox Los, MD   Diagnosis Other tear of medial meniscus, current injury, right knee, subsequent encounter [U19.805Q]    Treatment Diagnosis R knee pain, difficulty walking   Date of Evaluation 20   Additional Pertinent History See below      Functional Outcome Measure Used LEFS   Functional Outcome Score  (20) eval score 8      Insurance: Primary: Payor: MEDICARE /  /  / ,   Secondary: [facility] Information: Medicare- pays at 80%, modalities covered except ionto/MHP/CP not covered. Visit # 2, 2/10 for progress note   Visits Allowed: Unlimited visits   Recertification Date:    Physician Follow-Up: 2020   Physician Orders: PT per protocol   History of Present Illness: See below     SUBJECTIVE: Pt reporting of having a little bit of pain earlier today and did take a tylenol. Patient denies having any pain currently. TREATMENT   Precautions:    Pain: 5/10 lateral R knee    X in shaded column indicates activity completed today   Modalities Parameters/  Location  Notes                     Manual Therapy Time/Technique  Notes                     Exercise/Intervention   Notes   R heel slide with strap 15  x    Quad set towel roll under ankle 15 5 x -AROM R knee 0-132 degrees   SLR  15 5 x    SAQ 15 5 x    LAQ 15 5sec x                  NuStep seat 5, level 3 5min.       Hamstring, knee flexion and calf stretch at step 15sec 3x x    Standing: heel toe raises, marching, mini squats, 3 way hip 10x  x    4' step up forward lateral 20  x    Rocker board taps 15  x    Total Gym squats and heel raises 15  x      Specific Interventions Next Treatment: Bike, AROM, stretching, strengthening R LE , progress to standing , step, total gym    Activity/Treatment Tolerance:  [x]  Patient tolerated treatment well  []  Patient limited by fatigue  []  Patient limited by pain   []  Patient limited by medical complications  []  Other:     Assessment: Able to make progressions with patient tolerating very well. Today did have a little bit of clicking in knee with total gym squats and reporting medial knee a little tender at end of session but having no other complains. GOALS:  Patient Goal: to get to walking without walker  Short Term Goals:  Time Frame: deferred to LT's  Long Term Goals:  Time Frame: 5 weeks  1. Increase AROM R hip flexion 70, knee 0- 125 degrees to allow patient to walk and stand x 15 minutes for meal prep without assistive device  2. Increase strength R LE to 4-/5 to allow patient to go up and down 2 steps without assistive device  3. I with HEP as prescribed to allow patient to be I with dressing, showering  Patient Education:   [x]  HEP/Education Completed: monitor response to progressions.    [x]  No new Education completed  []  Reviewed Prior HEP      []  Patient verbalized and/or demonstrated understanding of education provided. []  Patient unable to verbalize and/or demonstrate understanding of education provided. Will continue education. []  Barriers to learning: NA    PLAN: 2 times per week for 5 weeks.   [x]  Continue with current plan of care  []  Modify plan of care as follows:    []  Hold pending physician visit  []  Discharge    Time In 1439   Time Out 1509   Timed Code Minutes: 30 min   Total Treatment Time: 30  min       Electronically Signed by: Nic Titus

## 2020-09-29 ENCOUNTER — HOSPITAL ENCOUNTER (OUTPATIENT)
Dept: PHYSICAL THERAPY | Age: 70
Setting detail: THERAPIES SERIES
Discharge: HOME | End: 2020-09-29
Payer: MEDICARE

## 2020-09-29 PROCEDURE — 97110 THERAPEUTIC EXERCISES: CPT

## 2020-09-29 NOTE — DISCHARGE SUMMARY
[address] [facility]  PHYSICAL THERAPY  [] EVALUATION  [] DAILY NOTE (LAND) [] DAILY NOTE (AQUATIC ) [] PROGRESS NOTE [x] DISCHARGE NOTE    [] [address] [facility]   [x] [address] [address]    [] [facility]   [] Bibi Driscoll    Date: 2020  Patient Name:  Nicky Luis  : 1950  MRN: [id number]    Referring Practitioner Lucrecia Cruz MD   Diagnosis Other tear of medial meniscus, current injury, right knee, subsequent encounter [S58.605Q]    Treatment Diagnosis R knee pain, difficulty walking   Date of Evaluation 20   Additional Pertinent History See below      Functional Outcome Measure Used LEFS   Functional Outcome Score  (20) eval score 8. Discharge score on   60      Insurance: Primary: Payor: Edward Renner /  /  / ,   Secondary: Nora Morris Information: Medicare- pays at 80%, modalities covered except ionto/MHP/CP not covered. Visit # 3, 3/10 for progress note   Visits Allowed: Unlimited visits   Recertification Date:    Physician Follow-Up: 2020   Physician Orders: PT per protocol   History of Present Illness: See below     SUBJECTIVE: patient to MD yesterday and OK to finish therapy if PT feel appropriate. Patient report tenderness 1/10 in knee which \"the doctor felt was normal\".   Taking tylenol every 1-2 days due to pain          TREATMENT   Precautions:    Pain: 1/10 lateral R knee    X in shaded column indicates activity completed today   Modalities Parameters/  Location  Notes                     Manual Therapy Time/Technique  Notes                     Exercise/Intervention   Notes   R heel slide with strap 20  x    Quad set towel roll under ankle 15 5 x -AROM R knee 0-132 degrees   SLR  15 5 x    SAQ 15 5     LAQ 15 5sec                   NuStep seat 5, level 3 5min.  x    Hamstring, knee flexion and calf stretch at step 15sec 3x x    Standing: heel toe raises, marching, mini

## 2020-12-02 ENCOUNTER — HOSPITAL ENCOUNTER (OUTPATIENT)
Dept: MAMMOGRAPHY | Age: 70
Discharge: HOME | End: 2020-12-02
Payer: MEDICARE

## 2020-12-02 PROCEDURE — 77063 BREAST TOMOSYNTHESIS BI: CPT

## 2021-01-08 ENCOUNTER — HOSPITAL ENCOUNTER (OUTPATIENT)
Age: 71
Discharge: HOME | End: 2021-01-08
Payer: MEDICARE

## 2021-01-08 LAB
ALBUMIN SERPL-MCNC: 4.1 G/DL (ref 3.5–5.1)
ALP BLD-CCNC: 44 U/L (ref 38–126)
ALT SERPL-CCNC: 15 U/L (ref 11–66)
ANION GAP SERPL CALCULATED.3IONS-SCNC: 9 MEQ/L (ref 8–16)
AST SERPL-CCNC: 19 U/L (ref 5–40)
BASOPHILS # BLD: 0.7 %
BASOPHILS ABSOLUTE: 0 THOU/MM3 (ref 0–0.1)
BILIRUB SERPL-MCNC: 0.5 MG/DL (ref 0.3–1.2)
BUN BLDV-MCNC: 11 MG/DL (ref 7–22)
CALCIUM SERPL-MCNC: 9.6 MG/DL (ref 8.5–10.5)
CHLORIDE BLD-SCNC: 104 MEQ/L (ref 98–111)
CHOLESTEROL, TOTAL: 199 MG/DL (ref 100–199)
CO2: 29 MEQ/L (ref 23–33)
CREAT SERPL-MCNC: 0.7 MG/DL (ref 0.4–1.2)
EOSINOPHIL # BLD: 2.2 %
EOSINOPHILS ABSOLUTE: 0.1 THOU/MM3 (ref 0–0.4)
ERYTHROCYTE [DISTWIDTH] IN BLOOD BY AUTOMATED COUNT: 13.4 % (ref 11.5–14.5)
ERYTHROCYTE [DISTWIDTH] IN BLOOD BY AUTOMATED COUNT: 49 FL (ref 35–45)
GFR SERPL CREATININE-BSD FRML MDRD: 82 ML/MIN/1.73M2
GLUCOSE BLD-MCNC: 88 MG/DL (ref 70–108)
HCT VFR BLD CALC: 46.3 % (ref 37–47)
HDLC SERPL-MCNC: 61 MG/DL
HEMOGLOBIN: 14.5 GM/DL (ref 12–16)
IMMATURE GRANS (ABS): 0.01 THOU/MM3 (ref 0–0.07)
IMMATURE GRANULOCYTES: 0.2 %
LDL CHOLESTEROL CALCULATED: 118 MG/DL
LYMPHOCYTES # BLD: 31 %
LYMPHOCYTES ABSOLUTE: 1.7 THOU/MM3 (ref 1–4.8)
MCH RBC QN AUTO: 30.9 PG (ref 26–33)
MCHC RBC AUTO-ENTMCNC: 31.3 GM/DL (ref 32.2–35.5)
MCV RBC AUTO: 98.7 FL (ref 81–99)
MONOCYTES # BLD: 6.5 %
MONOCYTES ABSOLUTE: 0.4 THOU/MM3 (ref 0.4–1.3)
NUCLEATED RED BLOOD CELLS: 0 /100 WBC
PLATELET # BLD: 213 THOU/MM3 (ref 130–400)
PMV BLD AUTO: 11.7 FL (ref 9.4–12.4)
POTASSIUM SERPL-SCNC: 4.4 MEQ/L (ref 3.5–5.2)
RBC # BLD: 4.69 MILL/MM3 (ref 4.2–5.4)
SEG NEUTROPHILS: 59.4 %
SEGMENTED NEUTROPHILS ABSOLUTE COUNT: 3.3 THOU/MM3 (ref 1.8–7.7)
SODIUM BLD-SCNC: 142 MEQ/L (ref 135–145)
TOTAL PROTEIN: 6.5 G/DL (ref 6.1–8)
TRIGL SERPL-MCNC: 100 MG/DL (ref 0–199)
WBC # BLD: 5.5 THOU/MM3 (ref 4.8–10.8)

## 2021-01-08 PROCEDURE — 85025 COMPLETE CBC W/AUTO DIFF WBC: CPT

## 2021-01-08 PROCEDURE — 36415 COLL VENOUS BLD VENIPUNCTURE: CPT

## 2021-01-08 PROCEDURE — 80061 LIPID PANEL: CPT

## 2021-01-08 PROCEDURE — 80053 COMPREHEN METABOLIC PANEL: CPT

## 2021-05-11 ENCOUNTER — HOSPITAL ENCOUNTER (EMERGENCY)
Age: 71
Discharge: HOME | End: 2021-05-11
Payer: MEDICARE

## 2021-05-11 ENCOUNTER — APPOINTMENT (OUTPATIENT)
Dept: GENERAL RADIOLOGY | Age: 71
End: 2021-05-11
Payer: MEDICARE

## 2021-05-11 VITALS
SYSTOLIC BLOOD PRESSURE: 129 MMHG | DIASTOLIC BLOOD PRESSURE: 58 MMHG | HEART RATE: 68 BPM | TEMPERATURE: 98 F | OXYGEN SATURATION: 96 % | RESPIRATION RATE: 18 BRPM | BODY MASS INDEX: 31.72 KG/M2 | WEIGHT: 168 LBS | HEIGHT: 61 IN

## 2021-05-11 DIAGNOSIS — R07.89 CHEST WALL PAIN: ICD-10-CM

## 2021-05-11 DIAGNOSIS — M25.512 ACUTE PAIN OF LEFT SHOULDER: ICD-10-CM

## 2021-05-11 DIAGNOSIS — M54.12 CERVICAL RADICULOPATHY: Primary | ICD-10-CM

## 2021-05-11 DIAGNOSIS — R07.9 CHEST PAIN, UNSPECIFIED TYPE: ICD-10-CM

## 2021-05-11 LAB
ANION GAP SERPL CALCULATED.3IONS-SCNC: 13 MEQ/L (ref 8–16)
APTT: 28.2 SECONDS (ref 22–38)
BASOPHILS # BLD: 0.6 %
BASOPHILS ABSOLUTE: 0 THOU/MM3 (ref 0–0.1)
BUN BLDV-MCNC: 11 MG/DL (ref 7–22)
CALCIUM SERPL-MCNC: 9.7 MG/DL (ref 8.5–10.5)
CHLORIDE BLD-SCNC: 97 MEQ/L (ref 98–111)
CO2: 26 MEQ/L (ref 23–33)
CREAT SERPL-MCNC: 0.7 MG/DL (ref 0.4–1.2)
D-DIMER QUANTITATIVE: 392 NG/ML FEU (ref 0–500)
EKG ATRIAL RATE: 86 BPM
EKG P AXIS: 66 DEGREES
EKG P-R INTERVAL: 138 MS
EKG Q-T INTERVAL: 378 MS
EKG QRS DURATION: 80 MS
EKG QTC CALCULATION (BAZETT): 452 MS
EKG R AXIS: 47 DEGREES
EKG T AXIS: 80 DEGREES
EKG VENTRICULAR RATE: 86 BPM
EOSINOPHIL # BLD: 1.7 %
EOSINOPHILS ABSOLUTE: 0.1 THOU/MM3 (ref 0–0.4)
ERYTHROCYTE [DISTWIDTH] IN BLOOD BY AUTOMATED COUNT: 13.7 % (ref 11.5–14.5)
ERYTHROCYTE [DISTWIDTH] IN BLOOD BY AUTOMATED COUNT: 48.5 FL (ref 35–45)
GFR SERPL CREATININE-BSD FRML MDRD: 82 ML/MIN/1.73M2
GLUCOSE BLD-MCNC: 111 MG/DL (ref 70–108)
HCT VFR BLD CALC: 46.3 % (ref 37–47)
HEMOGLOBIN: 14.8 GM/DL (ref 12–16)
IMMATURE GRANS (ABS): 0.03 THOU/MM3 (ref 0–0.07)
IMMATURE GRANULOCYTES: 0.5 %
INR BLD: 0.95 (ref 0.85–1.13)
LYMPHOCYTES # BLD: 31.2 %
LYMPHOCYTES ABSOLUTE: 2 THOU/MM3 (ref 1–4.8)
MCH RBC QN AUTO: 30.6 PG (ref 26–33)
MCHC RBC AUTO-ENTMCNC: 32 GM/DL (ref 32.2–35.5)
MCV RBC AUTO: 95.7 FL (ref 81–99)
MONOCYTES # BLD: 6.7 %
MONOCYTES ABSOLUTE: 0.4 THOU/MM3 (ref 0.4–1.3)
NUCLEATED RED BLOOD CELLS: 0 /100 WBC
OSMOLALITY CALCULATION: 272.1 MOSMOL/KG (ref 275–300)
PLATELET # BLD: 208 THOU/MM3 (ref 130–400)
PMV BLD AUTO: 11 FL (ref 9.4–12.4)
POTASSIUM REFLEX MAGNESIUM: 4.4 MEQ/L (ref 3.5–5.2)
PRO-BNP: 81.6 PG/ML (ref 0–900)
RBC # BLD: 4.84 MILL/MM3 (ref 4.2–5.4)
SEG NEUTROPHILS: 59.3 %
SEGMENTED NEUTROPHILS ABSOLUTE COUNT: 3.8 THOU/MM3 (ref 1.8–7.7)
SODIUM BLD-SCNC: 136 MEQ/L (ref 135–145)
TROPONIN T: < 0.01 NG/ML
WBC # BLD: 6.4 THOU/MM3 (ref 4.8–10.8)

## 2021-05-11 PROCEDURE — 6360000002 HC RX W HCPCS

## 2021-05-11 PROCEDURE — 83880 ASSAY OF NATRIURETIC PEPTIDE: CPT

## 2021-05-11 PROCEDURE — 85025 COMPLETE CBC W/AUTO DIFF WBC: CPT

## 2021-05-11 PROCEDURE — 99285 EMERGENCY DEPT VISIT HI MDM: CPT

## 2021-05-11 PROCEDURE — 96374 THER/PROPH/DIAG INJ IV PUSH: CPT

## 2021-05-11 PROCEDURE — 85379 FIBRIN DEGRADATION QUANT: CPT

## 2021-05-11 PROCEDURE — 71046 X-RAY EXAM CHEST 2 VIEWS: CPT

## 2021-05-11 PROCEDURE — 93005 ELECTROCARDIOGRAM TRACING: CPT | Performed by: NURSE PRACTITIONER

## 2021-05-11 PROCEDURE — 85730 THROMBOPLASTIN TIME PARTIAL: CPT

## 2021-05-11 PROCEDURE — 85610 PROTHROMBIN TIME: CPT

## 2021-05-11 PROCEDURE — 80048 BASIC METABOLIC PNL TOTAL CA: CPT

## 2021-05-11 PROCEDURE — 84484 ASSAY OF TROPONIN QUANT: CPT

## 2021-05-11 PROCEDURE — 36415 COLL VENOUS BLD VENIPUNCTURE: CPT

## 2021-05-11 RX ORDER — KETOROLAC TROMETHAMINE 30 MG/ML
INJECTION, SOLUTION INTRAMUSCULAR; INTRAVENOUS
Status: COMPLETED
Start: 2021-05-11 | End: 2021-05-11

## 2021-05-11 RX ORDER — KETOROLAC TROMETHAMINE 30 MG/ML
15 INJECTION, SOLUTION INTRAMUSCULAR; INTRAVENOUS ONCE
Status: COMPLETED | OUTPATIENT
Start: 2021-05-11 | End: 2021-05-11

## 2021-05-11 RX ORDER — ETODOLAC 400 MG/1
400 TABLET, FILM COATED ORAL 2 TIMES DAILY
Qty: 60 TABLET | Refills: 3 | Status: SHIPPED | OUTPATIENT
Start: 2021-05-11 | End: 2021-11-15

## 2021-05-11 RX ADMIN — KETOROLAC TROMETHAMINE 15 MG: 30 INJECTION, SOLUTION INTRAMUSCULAR; INTRAVENOUS at 11:26

## 2021-05-11 ASSESSMENT — PAIN DESCRIPTION - PAIN TYPE
TYPE: ACUTE PAIN
TYPE: ACUTE PAIN

## 2021-05-11 ASSESSMENT — ENCOUNTER SYMPTOMS
COUGH: 0
SHORTNESS OF BREATH: 0
ABDOMINAL PAIN: 0
EYE PAIN: 0
NAUSEA: 0
PHOTOPHOBIA: 0
VOMITING: 0
CHEST TIGHTNESS: 0

## 2021-05-11 ASSESSMENT — PAIN DESCRIPTION - FREQUENCY
FREQUENCY: CONTINUOUS
FREQUENCY: CONTINUOUS

## 2021-05-11 ASSESSMENT — PAIN SCALES - GENERAL
PAINLEVEL_OUTOF10: 2
PAINLEVEL_OUTOF10: 6

## 2021-05-11 ASSESSMENT — PAIN DESCRIPTION - ORIENTATION: ORIENTATION: LEFT;RIGHT

## 2021-05-11 ASSESSMENT — HEART SCORE: ECG: 0

## 2021-05-11 ASSESSMENT — PAIN DESCRIPTION - LOCATION: LOCATION: CHEST;ARM;SHOULDER

## 2021-05-11 NOTE — ED NOTES
Patient presents to ED for chest pain, left shoulder pain, and bilateral hand numbness. States she began having left sided chest pain that radiates into left shoulder since yesterday. Patient reports she has had right arm pain and bilateral hand numbness for 3 weeks. Notes chest pain is worse with deep breaths. Rates pain 6/10. Shows no signs of distress. Skin warm and dry. Respirations even unlabored.       Asa Bumpers, RN  05/11/21 6921

## 2021-05-11 NOTE — ED NOTES
Patient resting quietly in cot showing no signs of distress at this time. Denies any pain at this time.  remains at bedside. Updated on POC. Call light within reach.       Tiffany Cadena RN  05/11/21 1016

## 2021-05-11 NOTE — ED PROVIDER NOTES
[facility] COMPLAINT       Chief Complaint   Patient presents with    Chest Pain    Shoulder Pain     left    Numbness     bilateral hands       Nurses Notes reviewed and I agree except as noted in the HPI. HISTORY OF PRESENT ILLNESS    Leela Garcia is a 66 y.o. female who presents to the Emergency Department for the evaluation of chest pain, L shoulder pain, and bilateral hand numbness. Patient states she first noticed pain in her R arm 3 weeks ago and states that any touch to her R arm caused her pain and the hand numbness followed. She states yesterday morning she noticed her chest pain and L shoulder pain after completing her normal morning floor exercise. She states her pain is localized to the L side of her chest and her posterior L shoulder and describes it as a constant achy pain. She notes that it is worse with deep inspiration and she was unable to sleep last night due to the pain. She has tried taking Tylenol at home with no relief. She rates her pain at 4/10. She does have a significant history of hypertension and hyperlipidemia. She underwent a cardiac catheterization in 2006 which was normal, and had a cardiac ultrasound performed in 2009 that showed a \"leaky\" valve. She denies any cough, shortness of breath, nausea or vomiting, or fever. The HPI was provided by the patient. REVIEW OF SYSTEMS     Review of Systems   Constitutional: Negative for activity change, chills, diaphoresis, fatigue and fever. HENT: Negative for congestion, ear pain, nosebleeds, rhinorrhea, sinus pressure, sinus pain, sore throat and trouble swallowing. Eyes: Negative for photophobia and pain. Respiratory: Negative for cough, chest tightness, shortness of breath and wheezing. Cardiovascular: Positive for chest pain. Negative for palpitations and leg swelling.    Gastrointestinal: Negative for abdominal pain, constipation, the status of her mother is unknown. She indicated that the status of her brother is unknown.   family history includes Heart Disease in her brother and mother. SOCIAL HISTORY      reports that she has never smoked. She has never used smokeless tobacco. She reports current alcohol use of about 1.0 standard drinks of alcohol per week. She reports that she does not use drugs. PHYSICAL EXAM     INITIAL VITALS:  height is 5' 1\" (1.549 m) and weight is 168 lb (76.2 kg). Her oral temperature is 98 °F (36.7 °C). Her blood pressure is 129/58 (abnormal) and her pulse is 68. Her respiration is 18 and oxygen saturation is 96%. Physical Exam  Constitutional:       Appearance: Normal appearance. Cardiovascular:      Rate and Rhythm: Normal rate and regular rhythm. Pulses: Normal pulses. Heart sounds: Normal heart sounds. Pulmonary:      Effort: Pulmonary effort is normal.      Breath sounds: Normal breath sounds. Musculoskeletal:      Right shoulder: She exhibits tenderness. Arms:       Comments: Tender to palpation over the L scapula   Neurological:      General: No focal deficit present. Mental Status: She is alert and oriented to person, place, and time. Psychiatric:         Mood and Affect: Mood normal.          DIFFERENTIAL DIAGNOSIS:   Chest wall strain, shoulder strain, costochondritis, ACS, MI, PE    DIAGNOSTIC RESULTS     EKG: All EKG's are interpreted by the Emergency Department Physician who either signs or Co-signs this chart in the absence of a cardiologist.    Normal sinus rhythm with rate of 86, , QRS of 80, QTc of 452. Compared with EKG from January 20, 2020 with no significant changes found. EKG interpreted by ED physician    RADIOLOGY: non-plainfilm images(s) such as CT, Ultrasound and MRI are read by the radiologist.    XR CHEST (2 VW)   Final Result      Minimal left basilar atelectasis/infiltrate.                **This report has been created using voice recognition software. It may contain minor errors which are inherent in voice recognition technology. **      Final report electronically signed by Dr. Jessika Garland on 5/11/2021 9:51 AM          LABS:     Labs Reviewed   CBC WITH AUTO DIFFERENTIAL - Abnormal; Notable for the following components:       Result Value    MCHC 32.0 (*)     RDW-SD 48.5 (*)     All other components within normal limits   BASIC METABOLIC PANEL W/ REFLEX TO MG FOR LOW K - Abnormal; Notable for the following components:    Chloride 97 (*)     Glucose 111 (*)     All other components within normal limits   GLOMERULAR FILTRATION RATE, ESTIMATED - Abnormal; Notable for the following components:    Est, Glom Filt Rate 82 (*)     All other components within normal limits   OSMOLALITY - Abnormal; Notable for the following components:    Osmolality Calc 272.1 (*)     All other components within normal limits   TROPONIN   BRAIN NATRIURETIC PEPTIDE   APTT   PROTIME-INR   D-DIMER, QUANTITATIVE   ANION GAP       EMERGENCY DEPARTMENT COURSE:   Vitals:    Vitals:    05/11/21 0913 05/11/21 1015 05/11/21 1126 05/11/21 1235   BP: (!) 161/70 (!) 150/71 (!) 150/65 (!) 129/58   Pulse:  71 65 68   Resp:  18 18 18   Temp:       TempSrc:       SpO2:  96% 97% 96%   Weight:       Height:           8:49 AM EDT: The patient was seen and evaluated. MDM:  Patient seen and evaluated today for left shoulder pain and left-sided chest pain. Pain started after performing some for exercises however patient does have a significant cardiac history and had some concerns due to pain and family history. On my exam she was in no acute distress. Had some reproducible tenderness to left shoulder left chest.  Appropriate labs were ordered, patient remained on cardiac monitor throughout ED stay, EKG completed and compared with previous with no acute changes. Troponin and D-dimer found to be within normal limits.   All other labs reviewed, patient was treated with Toradol with significant improvement in pain. I believe this is likely musculoskeletal.  Instructed to follow-up with PCP in several days for reevaluation is return to the emergency department with worsening symptoms. Also recommended the patient reestablish care with cardiology who she has not seen in several years. She was amenable to this plan, she was given the opportunity ask questions and all questions were answered. Patient and  were amenable to plan for discharge and departed the ED in stable condition. CRITICAL CARE:   None    CONSULTS:  None    PROCEDURES:  None    FINAL IMPRESSION      1. Cervical radiculopathy    2. Acute pain of left shoulder    3. Chest wall pain    4. Chest pain, unspecified type          DISPOSITION/PLAN   Discharge    PATIENT REFERRED TO:  [address] [facility] [address] [address] EMERGENCY DEPT  [address]  Go to   If symptoms worsen    MD Ladi Wagoner [address]  [place] [facility].[address]    Schedule an appointment as soon as possible for a visit         DISCHARGE MEDICATIONS:  Discharge Medication List as of 5/11/2021 12:20 PM      START taking these medications    Details   etodolac (LODINE) 400 MG tablet Take 1 tablet by mouth 2 times daily, Disp-60 tablet, R-3Print             (Please note that portions of this note were completed with a voice recognition program.  Efforts were made to edit the dictations but occasionally words are mis-transcribed.)    The patient was given an opportunity to see the Emergency Attending. The patient voiced understanding that I was a Mid-LevelProvider and was in agreement with being seen independently by myself. Provider:  I personally performed the services described in the documentation, reviewed and edited the documentation which was dictated to the scribe in my presence, and it accurately records my words and actions.     VASU Aguilar - CNP, 5/11/21, 8:56 PM       VASU Aguilar - CNP  05/13/21 2059

## 2021-05-11 NOTE — ED NOTES
Patient resting quietly in cot showing no signs of distress at this time. Rates pain 5/10 only with movement. Medicated per MAR. Call light within reach.       Jessica Nagy RN  05/11/21 9737

## 2021-05-13 ASSESSMENT — ENCOUNTER SYMPTOMS
RHINORRHEA: 0
WHEEZING: 0
TROUBLE SWALLOWING: 0
COLOR CHANGE: 0
DIARRHEA: 0
SINUS PRESSURE: 0
CONSTIPATION: 0
SORE THROAT: 0
SINUS PAIN: 0
BACK PAIN: 0

## 2021-05-14 ENCOUNTER — OFFICE VISIT (OUTPATIENT)
Dept: CARDIOLOGY CLINIC | Age: 71
End: 2021-05-14
Payer: MEDICARE

## 2021-05-14 VITALS
WEIGHT: 167.6 LBS | BODY MASS INDEX: 31.64 KG/M2 | SYSTOLIC BLOOD PRESSURE: 153 MMHG | HEIGHT: 61 IN | HEART RATE: 71 BPM | DIASTOLIC BLOOD PRESSURE: 81 MMHG

## 2021-05-14 DIAGNOSIS — R07.2 PRECORDIAL PAIN: ICD-10-CM

## 2021-05-14 DIAGNOSIS — E78.01 FAMILIAL HYPERCHOLESTEROLEMIA: ICD-10-CM

## 2021-05-14 DIAGNOSIS — I10 ESSENTIAL HYPERTENSION: Primary | ICD-10-CM

## 2021-05-14 PROCEDURE — G8400 PT W/DXA NO RESULTS DOC: HCPCS | Performed by: NUCLEAR MEDICINE

## 2021-05-14 PROCEDURE — 1036F TOBACCO NON-USER: CPT | Performed by: NUCLEAR MEDICINE

## 2021-05-14 PROCEDURE — 1090F PRES/ABSN URINE INCON ASSESS: CPT | Performed by: NUCLEAR MEDICINE

## 2021-05-14 PROCEDURE — G8417 CALC BMI ABV UP PARAM F/U: HCPCS | Performed by: NUCLEAR MEDICINE

## 2021-05-14 PROCEDURE — 3017F COLORECTAL CA SCREEN DOC REV: CPT | Performed by: NUCLEAR MEDICINE

## 2021-05-14 PROCEDURE — 1123F ACP DISCUSS/DSCN MKR DOCD: CPT | Performed by: NUCLEAR MEDICINE

## 2021-05-14 PROCEDURE — G8427 DOCREV CUR MEDS BY ELIG CLIN: HCPCS | Performed by: NUCLEAR MEDICINE

## 2021-05-14 PROCEDURE — 99204 OFFICE O/P NEW MOD 45 MIN: CPT | Performed by: NUCLEAR MEDICINE

## 2021-05-14 PROCEDURE — 4040F PNEUMOC VAC/ADMIN/RCVD: CPT | Performed by: NUCLEAR MEDICINE

## 2021-05-14 ASSESSMENT — ENCOUNTER SYMPTOMS
COLOR CHANGE: 0
NAUSEA: 0
PHOTOPHOBIA: 0
RECTAL PAIN: 0
ABDOMINAL PAIN: 0
SHORTNESS OF BREATH: 1
ANAL BLEEDING: 0
BACK PAIN: 0
CONSTIPATION: 0
DIARRHEA: 0
ABDOMINAL DISTENTION: 0
VOMITING: 0
BLOOD IN STOOL: 0
CHEST TIGHTNESS: 1

## 2021-05-14 NOTE — PROGRESS NOTES
Patient reports some continued chest discomfort that radiates under left breast, left arm, and back; patient denies current SOB.

## 2021-05-14 NOTE — PROGRESS NOTES
[address]  [address] [place] [id number]  Dept: [phone]  Dept Fax: [phone]  Loc: [phone]    Visit Date: 5/14/2021    Belkis Macias is a 66 y.o. female who presents todayfor:  Chief Complaint   Patient presents with    Follow-Up from Hospital    Hypertension    Hyperlipidemia    Chest Pain   here for the ER for the first time  Started with arm numbness and arm pain   Right then went to the left  Some chest discomfort as well  Was resting   No specific triggers  Not exertional   Chest pain was int he middle to the left  Does fair with exertion   Some dyspnea on exertion   Cath 2006 with mild CAD  Does have HTN and hyperlipidemia  On medical RX  No Dm   No smoking   Family history of CAD  Mother with CAd  Father with CHF  Brother with CABG     HPI:  HPI  Past Medical History:   Diagnosis Date    Cancer ([facility] [address]     BCC, SCC    Hyperlipidemia     Hypertension       Past Surgical History:   Procedure Laterality Date    CHEST WALL RESECTION Right 2/10/2020    EXCISION SCC RIGHT MID CHEST performed by Neftaly Friedman MD at [address] [place] [address]       Family History   Problem Relation Age of Onset    Heart Disease Mother     Heart Disease Brother      Social History     Tobacco Use    Smoking status: Never Smoker    Smokeless tobacco: Never Used   Substance Use Topics    Alcohol use:  Yes     Alcohol/week: 1.0 standard drinks     Types: 1 Glasses of wine per week     Comment: occasionally      Current Outpatient Medications   Medication Sig Dispense Refill    etodolac (LODINE) 400 MG tablet Take 1 tablet by mouth 2 times daily 60 tablet 3    sertraline (ZOLOFT) 50 MG tablet Take 50 mg by mouth daily       Potassium 99 MG TABS Take 1 tablet by mouth daily      Calcium Carbonate-Vitamin D (CALCIUM 600+D HIGH POTENCY PO) Take 1 tablet by mouth daily      acetaminophen (APAP EXTRA STRENGTH) 500 MG tablet Take 2 tablets by mouth every 6 hours as needed for Pain 120 tablet 3    ferrous sulfate 325 (65 FE) MG tablet Take 325 mg by mouth daily (with breakfast).  Multiple Vitamin (MULTI-VITAMIN PO) Take  by mouth.  simvastatin (ZOCOR) 20 MG tablet Take 20 mg by mouth nightly Stopped last week because of muscle weakness      lisinopril (PRINIVIL;ZESTRIL) 10 MG tablet Take 10 mg by mouth daily. No current facility-administered medications for this visit. Allergies   Allergen Reactions    Lipitor Other (See Comments)     WEAK MUSCLES    Oxycodone-Acetaminophen Other (See Comments)     HALLUCINATIONS    Tylox [Oxycodone-Acetaminophen] Other (See Comments)     hallucinations     Health Maintenance   Topic Date Due    Hepatitis C screen  Never done    COVID-19 Vaccine (1) Never done    DTaP/Tdap/Td vaccine (1 - Tdap) Never done    Shingles Vaccine (1 of 2) Never done    Colon cancer screen colonoscopy  Never done    Pneumococcal 65+ years Vaccine (1 of 1 - PPSV23) Never done   [employer] Visit (AWV)  Never done    Lipid screen  01/08/2022    Potassium monitoring  05/11/2022    Creatinine monitoring  05/11/2022    Breast cancer screen  12/02/2022    DEXA (modify frequency per FRAX score)  Completed    Flu vaccine  Completed    Hepatitis A vaccine  Aged Out    Hepatitis B vaccine  Aged Out    Hib vaccine  Aged Out    Meningococcal (ACWY) vaccine  Aged Out       Subjective:  Review of Systems   Constitutional: Positive for fatigue. HENT: Negative for ear discharge and mouth sores. Eyes: Negative for photophobia. Respiratory: Positive for chest tightness and shortness of breath. Cardiovascular: Positive for chest pain. Negative for palpitations. Gastrointestinal: Negative for abdominal distention, abdominal pain, anal bleeding, blood in stool, constipation, diarrhea, nausea, rectal pain and vomiting. Endocrine: Negative for polyphagia.    Genitourinary: Negative for dysuria and hematuria. Musculoskeletal: Negative for arthralgias, back pain, gait problem, joint swelling, myalgias, neck pain and neck stiffness. Skin: Negative for color change, pallor, rash and wound. Allergic/Immunologic: Negative for food allergies. Neurological: Negative for dizziness, syncope and light-headedness. Psychiatric/Behavioral: Negative for behavioral problems, confusion, decreased concentration, dysphoric mood and hallucinations. The patient is not nervous/anxious and is not hyperactive. Objective:  Physical Exam  HENT:      Head: Normocephalic. Right Ear: Tympanic membrane normal.      Nose: Nose normal.      Mouth/Throat:      Mouth: Mucous membranes are moist.   Eyes:      Pupils: Pupils are equal, round, and reactive to light. Neck:      Musculoskeletal: Normal range of motion. Cardiovascular:      Rate and Rhythm: Normal rate. Pulses: Normal pulses. Heart sounds: Murmur present. No gallop. Pulmonary:      Effort: No respiratory distress. Breath sounds: No stridor. No wheezing, rhonchi or rales. Chest:      Chest wall: No tenderness. Abdominal:      General: There is no distension. Palpations: There is no mass. Tenderness: There is no abdominal tenderness. There is no right CVA tenderness, left CVA tenderness, guarding or rebound. Hernia: No hernia is present. Musculoskeletal:         General: No swelling, tenderness, deformity or signs of injury. Right lower leg: No edema. Left lower leg: No edema. Skin:     Coloration: Skin is not jaundiced or pale. Findings: No bruising, erythema, lesion or rash. Neurological:      Mental Status: She is alert and oriented to person, place, and time. Cranial Nerves: No cranial nerve deficit. Sensory: No sensory deficit. Motor: No weakness.       Coordination: Coordination normal.      Gait: Gait normal.      Deep Tendon Reflexes: Reflexes normal. Psychiatric:         Mood and Affect: Mood normal.         Thought Content: Thought content normal.       BP (!) 153/81   Pulse 71   Ht 5' 1\" (1.549 m)   Wt 167 lb 9.6 oz (76 kg)   BMI 31.67 kg/m²     Assessment:      Diagnosis Orders   1. Essential hypertension     2. Familial hypercholesterolemia     3. Precordial pain     as above  Symptoms as above  ? ? Angina   ? ? c spine issues  Major family history of CAD  Non specific ECG     Plan:  No follow-ups on file. Discussed  Non invasive cardiac testing will be ordered to further evaluate for any ischemic or structural heart disease as a cause of the patient symptoms. We will proceed with a Stress Cardiolite test and echo soon. Consider ortho referral   .[email or web address]  Thank you for allowing me to participate in the care of your patient. Please don't hesitate to contact me regarding any further issues related to the patient care    Orders Placed:  No orders of the defined types were placed in this encounter. Medications Prescribed:  No orders of the defined types were placed in this encounter. Discussed use, benefit, and side effects of prescribed medications. All patient questions answered. Pt voicedunderstanding. Instructed to continue current medications, diet and exercise. Continue risk factor modification and medical management. Patient agreed with treatment plan. Follow up as directed.     Electronically signedby Malissa Vasquez MD on 5/14/2021 at 10:00 AM

## 2021-05-26 ENCOUNTER — HOSPITAL ENCOUNTER (OUTPATIENT)
Dept: NON INVASIVE DIAGNOSTICS | Age: 71
Discharge: HOME | End: 2021-05-26
Payer: MEDICARE

## 2021-05-26 DIAGNOSIS — E78.01 FAMILIAL HYPERCHOLESTEROLEMIA: ICD-10-CM

## 2021-05-26 DIAGNOSIS — R07.2 PRECORDIAL PAIN: ICD-10-CM

## 2021-05-26 DIAGNOSIS — I10 ESSENTIAL HYPERTENSION: ICD-10-CM

## 2021-05-26 LAB
LV EF: 45 %
LVEF MODALITY: NORMAL

## 2021-05-26 PROCEDURE — A9500 TC99M SESTAMIBI: HCPCS | Performed by: NUCLEAR MEDICINE

## 2021-05-26 PROCEDURE — 6360000002 HC RX W HCPCS

## 2021-05-26 PROCEDURE — 78452 HT MUSCLE IMAGE SPECT MULT: CPT

## 2021-05-26 PROCEDURE — 93306 TTE W/DOPPLER COMPLETE: CPT

## 2021-05-26 PROCEDURE — 93017 CV STRESS TEST TRACING ONLY: CPT

## 2021-05-26 PROCEDURE — 3430000000 HC RX DIAGNOSTIC RADIOPHARMACEUTICAL: Performed by: NUCLEAR MEDICINE

## 2021-05-26 PROCEDURE — 93017 CV STRESS TEST TRACING ONLY: CPT | Performed by: NUCLEAR MEDICINE

## 2021-05-26 RX ADMIN — Medication 9.7 MILLICURIE: at 09:10

## 2021-05-26 RX ADMIN — Medication 32.5 MILLICURIE: at 10:20

## 2021-05-27 ENCOUNTER — TELEPHONE (OUTPATIENT)
Dept: CARDIOLOGY CLINIC | Age: 71
End: 2021-05-27

## 2021-09-07 ENCOUNTER — HOSPITAL ENCOUNTER (EMERGENCY)
Age: 71
Discharge: HOME | End: 2021-09-07
Payer: MEDICARE

## 2021-09-07 VITALS
TEMPERATURE: 96.4 F | WEIGHT: 167 LBS | OXYGEN SATURATION: 96 % | DIASTOLIC BLOOD PRESSURE: 83 MMHG | HEIGHT: 61 IN | BODY MASS INDEX: 31.53 KG/M2 | RESPIRATION RATE: 16 BRPM | SYSTOLIC BLOOD PRESSURE: 188 MMHG | HEART RATE: 84 BPM

## 2021-09-07 DIAGNOSIS — J01.90 ACUTE BACTERIAL SINUSITIS: Primary | ICD-10-CM

## 2021-09-07 DIAGNOSIS — B96.89 ACUTE BACTERIAL SINUSITIS: Primary | ICD-10-CM

## 2021-09-07 PROCEDURE — 99213 OFFICE O/P EST LOW 20 MIN: CPT

## 2021-09-07 PROCEDURE — 99213 OFFICE O/P EST LOW 20 MIN: CPT | Performed by: NURSE PRACTITIONER

## 2021-09-07 RX ORDER — AMOXICILLIN AND CLAVULANATE POTASSIUM 875; 125 MG/1; MG/1
1 TABLET, FILM COATED ORAL 2 TIMES DAILY
Qty: 14 TABLET | Refills: 0 | Status: SHIPPED | OUTPATIENT
Start: 2021-09-07 | End: 2021-09-14

## 2021-09-07 RX ORDER — PREDNISONE 20 MG/1
40 TABLET ORAL DAILY
Qty: 10 TABLET | Refills: 0 | Status: SHIPPED | OUTPATIENT
Start: 2021-09-07 | End: 2021-09-12

## 2021-09-07 ASSESSMENT — PAIN DESCRIPTION - FREQUENCY: FREQUENCY: CONTINUOUS

## 2021-09-07 ASSESSMENT — PAIN SCALES - GENERAL: PAINLEVEL_OUTOF10: 2

## 2021-09-07 ASSESSMENT — ENCOUNTER SYMPTOMS
SHORTNESS OF BREATH: 0
VOMITING: 0
NAUSEA: 0

## 2021-09-07 ASSESSMENT — PAIN DESCRIPTION - LOCATION: LOCATION: GENERALIZED

## 2021-09-07 ASSESSMENT — PAIN DESCRIPTION - DESCRIPTORS: DESCRIPTORS: ACHING

## 2021-09-07 NOTE — ED NOTES
KITTY Case CNP reviewed discharge instructions with patient who voiced understanding.      Alessio Mo RN  09/07/21 7109
ankle

## 2021-09-07 NOTE — ED TRIAGE NOTES
Pt states she has had persistent cough x 1 month recently productive green sputum. C/O sinus pressure and bilateral ear pain x 1 month. C/O generalized weakness and fatigue. Lungs clear t/o bilateral. Skin is warm and dry. Respirations are easy & non-labored. A & O x 3.

## 2021-09-08 ASSESSMENT — ENCOUNTER SYMPTOMS
SINUS PAIN: 1
COUGH: 1
DIARRHEA: 0
SORE THROAT: 1
SINUS PRESSURE: 1

## 2021-09-13 ENCOUNTER — HOSPITAL ENCOUNTER (OUTPATIENT)
Age: 71
Discharge: HOME | End: 2021-09-13
Payer: MEDICARE

## 2021-09-13 ENCOUNTER — HOSPITAL ENCOUNTER (OUTPATIENT)
Dept: GENERAL RADIOLOGY | Age: 71
Discharge: HOME | End: 2021-09-13
Payer: MEDICARE

## 2021-09-13 DIAGNOSIS — R05.9 COUGH: ICD-10-CM

## 2021-09-13 PROCEDURE — 71046 X-RAY EXAM CHEST 2 VIEWS: CPT

## 2021-11-15 ENCOUNTER — OFFICE VISIT (OUTPATIENT)
Dept: CARDIOLOGY CLINIC | Age: 71
End: 2021-11-15
Payer: MEDICARE

## 2021-11-15 VITALS
HEART RATE: 68 BPM | BODY MASS INDEX: 31.28 KG/M2 | SYSTOLIC BLOOD PRESSURE: 160 MMHG | WEIGHT: 170 LBS | DIASTOLIC BLOOD PRESSURE: 82 MMHG | HEIGHT: 62 IN

## 2021-11-15 DIAGNOSIS — I35.1 NONRHEUMATIC AORTIC VALVE INSUFFICIENCY: ICD-10-CM

## 2021-11-15 DIAGNOSIS — R06.02 SOB (SHORTNESS OF BREATH): ICD-10-CM

## 2021-11-15 DIAGNOSIS — I10 PRIMARY HYPERTENSION: Primary | ICD-10-CM

## 2021-11-15 PROCEDURE — 1036F TOBACCO NON-USER: CPT | Performed by: NUCLEAR MEDICINE

## 2021-11-15 PROCEDURE — G8417 CALC BMI ABV UP PARAM F/U: HCPCS | Performed by: NUCLEAR MEDICINE

## 2021-11-15 PROCEDURE — G8400 PT W/DXA NO RESULTS DOC: HCPCS | Performed by: NUCLEAR MEDICINE

## 2021-11-15 PROCEDURE — 99214 OFFICE O/P EST MOD 30 MIN: CPT | Performed by: NUCLEAR MEDICINE

## 2021-11-15 PROCEDURE — 4040F PNEUMOC VAC/ADMIN/RCVD: CPT | Performed by: NUCLEAR MEDICINE

## 2021-11-15 PROCEDURE — 3017F COLORECTAL CA SCREEN DOC REV: CPT | Performed by: NUCLEAR MEDICINE

## 2021-11-15 PROCEDURE — G8427 DOCREV CUR MEDS BY ELIG CLIN: HCPCS | Performed by: NUCLEAR MEDICINE

## 2021-11-15 PROCEDURE — 1123F ACP DISCUSS/DSCN MKR DOCD: CPT | Performed by: NUCLEAR MEDICINE

## 2021-11-15 PROCEDURE — 1090F PRES/ABSN URINE INCON ASSESS: CPT | Performed by: NUCLEAR MEDICINE

## 2021-11-15 PROCEDURE — G8484 FLU IMMUNIZE NO ADMIN: HCPCS | Performed by: NUCLEAR MEDICINE

## 2021-11-15 RX ORDER — METOPROLOL SUCCINATE 25 MG/1
25 TABLET, EXTENDED RELEASE ORAL DAILY
Qty: 90 TABLET | Refills: 3 | Status: SHIPPED | OUTPATIENT
Start: 2021-11-15

## 2021-11-15 NOTE — PROGRESS NOTES
[id number] [facility] [employer] .  [address]  [facility] [address]  Dept: [phone]  Dept Fax: [phone]  Loc: [phone]    Visit Date: 11/15/2021    Brayden Galan is a 66 y.o. female who presents todayfor:  Chief Complaint   Patient presents with    Check-Up    Hypertension    Cardiac Valve Problem   mild CMP and mild AI on recent echo   Some dyspnea  More than usual   gradual in nature  Slow worsening  No chest pain more fullness  And heaviness  Bp is higher lately   No dizziness  No syncope  No smoking  Does have hyperlipidemia  On statins for hyperlipidemia  Does have major family history of CAD       HPI:  HPI  Past Medical History:   Diagnosis Date    [facility]     BCC, SCC    Hyperlipidemia     Hypertension       Past Surgical History:   Procedure Laterality Date    CHEST WALL RESECTION Right 2/10/2020    EXCISION SCC RIGHT MID CHEST performed by Yvrose Otero MD at [address] [place] [address]       Family History   Problem Relation Age of Onset    Heart Disease Mother     Heart Disease Brother      Social History     Tobacco Use    Smoking status: Never Smoker    Smokeless tobacco: Never Used   Substance Use Topics    Alcohol use: Yes     Alcohol/week: 1.0 standard drink     Types: 1 Glasses of wine per week     Comment: occasionally      Current Outpatient Medications   Medication Sig Dispense Refill    sertraline (ZOLOFT) 50 MG tablet Take 50 mg by mouth daily       Potassium 99 MG TABS Take 1 tablet by mouth daily      Calcium Carbonate-Vitamin D (CALCIUM 600+D HIGH POTENCY PO) Take 1 tablet by mouth daily      acetaminophen (APAP EXTRA STRENGTH) 500 MG tablet Take 2 tablets by mouth every 6 hours as needed for Pain 120 tablet 3    ferrous sulfate 325 (65 FE) MG tablet Take 325 mg by mouth daily (with breakfast).  Multiple Vitamin (MULTI-VITAMIN PO) Take  by mouth.       simvastatin (ZOCOR) 20 MG tablet Take 20 mg by mouth nightly Stopped last week because of muscle weakness      lisinopril (PRINIVIL;ZESTRIL) 10 MG tablet Take 10 mg by mouth daily. No current facility-administered medications for this visit. Allergies   Allergen Reactions    Lipitor Other (See Comments)     WEAK MUSCLES    Oxycodone-Acetaminophen Other (See Comments)     HALLUCINATIONS    Tylox [Oxycodone-Acetaminophen] Other (See Comments)     hallucinations     Health Maintenance   Topic Date Due    Hepatitis C screen  Never done    COVID-19 Vaccine (1) Never done    DTaP/Tdap/Td vaccine (1 - Tdap) Never done    Colon cancer screen colonoscopy  Never done    Shingles Vaccine (1 of 2) Never done    Pneumococcal 65+ years Vaccine (1 of 1 - PPSV23) Never done   [employer] Visit (AWV)  Never done    Lipid screen  01/08/2022    Potassium monitoring  05/11/2022    Creatinine monitoring  05/11/2022    Breast cancer screen  12/02/2022    DEXA (modify frequency per FRAX score)  Completed    Flu vaccine  Completed    Hepatitis A vaccine  Aged Out    Hepatitis B vaccine  Aged Out    Hib vaccine  Aged Out    Meningococcal (ACWY) vaccine  Aged Out       Subjective:  Review of Systems  General:   No fever, no chills, some fatigue or weight loss  Pulmonary:    some more dyspnea, no wheezing  Cardiac:    Denies recent chest pain,   GI:     No nausea or vomiting, no abdominal pain  Neuro:     No dizziness or light headedness,   Musculoskeletal:  No recent active issues  Extremities:   No edema, no obvious claudication       Objective:  Physical Exam  BP (!) 160/82   Pulse 68   Ht 5' 1.5\" (1.562 m)   Wt 170 lb (77.1 kg)   BMI 31.60 kg/m²   General:   Well developed, well nourished  Lungs:   Clear to auscultation  Heart:    Normal S1 S2, Slight murmur.  no rubs, no gallops  Abdomen:   Soft, non tender, no organomegalies, positive bowel sounds  Extremities:   No edema, no cyanosis, good peripheral pulses  Neurological:   Awake, alert, oriented. No obvious focal deficits  Musculoskelatal:  No obvious deformities    Assessment:      Diagnosis Orders   1. Primary hypertension     2. Nonrheumatic aortic valve insufficiency     as above  Mild CMP and mild AI  Possible sleep apnea  ? ? Underlying CAD  Major family history of CAD      Plan:  No follow-ups on file. Discussed  ? ? Cath   Vs medical RX  Add beta blockers  Discussed all the ins and outs of treatment plans  Continue risk factor modification and medical management  Thank you for allowing me to participate in the care of your patient. Please don't hesitate to contact me regarding any further issues related to the patient care    Orders Placed:  No orders of the defined types were placed in this encounter. Medications Prescribed:  No orders of the defined types were placed in this encounter. Discussed use, benefit, and side effects of prescribed medications. All patient questions answered. Pt voicedunderstanding. Instructed to continue current medications, diet and exercise. Continue risk factor modification and medical management. Patient agreed with treatment plan. Follow up as directed.     Electronically signedby Alida Sheth MD on 11/15/2021 at 8:06 AM

## 2021-11-19 ENCOUNTER — PREP FOR PROCEDURE (OUTPATIENT)
Dept: CARDIOLOGY | Age: 71
End: 2021-11-19

## 2021-11-19 RX ORDER — SODIUM CHLORIDE 9 MG/ML
25 INJECTION, SOLUTION INTRAVENOUS PRN
Status: CANCELLED | OUTPATIENT
Start: 2021-11-19

## 2021-11-19 RX ORDER — SODIUM CHLORIDE 0.9 % (FLUSH) 0.9 %
5-40 SYRINGE (ML) INJECTION EVERY 12 HOURS SCHEDULED
Status: CANCELLED | OUTPATIENT
Start: 2021-11-19

## 2021-11-19 RX ORDER — SODIUM CHLORIDE 0.9 % (FLUSH) 0.9 %
5-40 SYRINGE (ML) INJECTION PRN
Status: CANCELLED | OUTPATIENT
Start: 2021-11-19

## 2021-11-19 RX ORDER — NITROGLYCERIN 0.4 MG/1
0.4 TABLET SUBLINGUAL EVERY 5 MIN PRN
Status: CANCELLED | OUTPATIENT
Start: 2021-11-19

## 2021-11-19 RX ORDER — SODIUM CHLORIDE 9 MG/ML
INJECTION, SOLUTION INTRAVENOUS CONTINUOUS
Status: CANCELLED | OUTPATIENT
Start: 2021-11-19

## 2021-11-19 RX ORDER — ASPIRIN 325 MG
325 TABLET ORAL ONCE
Status: CANCELLED | OUTPATIENT
Start: 2021-11-19 | End: 2021-11-19

## 2021-12-06 ENCOUNTER — HOSPITAL ENCOUNTER (OUTPATIENT)
Dept: MAMMOGRAPHY | Age: 71
Discharge: HOME | End: 2021-12-06
Payer: MEDICARE

## 2021-12-06 DIAGNOSIS — Z12.39 BREAST SCREENING: ICD-10-CM

## 2021-12-06 PROCEDURE — 77063 BREAST TOMOSYNTHESIS BI: CPT

## 2022-02-17 ENCOUNTER — OFFICE VISIT (OUTPATIENT)
Dept: CARDIOLOGY CLINIC | Age: 72
End: 2022-02-17
Payer: MEDICARE

## 2022-02-17 VITALS
HEIGHT: 62 IN | SYSTOLIC BLOOD PRESSURE: 152 MMHG | WEIGHT: 171 LBS | HEART RATE: 60 BPM | DIASTOLIC BLOOD PRESSURE: 78 MMHG | BODY MASS INDEX: 31.47 KG/M2

## 2022-02-17 DIAGNOSIS — I42.0 DILATED CARDIOMYOPATHY (HCC): ICD-10-CM

## 2022-02-17 DIAGNOSIS — I25.10 CORONARY ARTERY DISEASE INVOLVING NATIVE CORONARY ARTERY OF NATIVE HEART WITHOUT ANGINA PECTORIS: Primary | ICD-10-CM

## 2022-02-17 PROCEDURE — 1036F TOBACCO NON-USER: CPT | Performed by: NUCLEAR MEDICINE

## 2022-02-17 PROCEDURE — G8400 PT W/DXA NO RESULTS DOC: HCPCS | Performed by: NUCLEAR MEDICINE

## 2022-02-17 PROCEDURE — G8417 CALC BMI ABV UP PARAM F/U: HCPCS | Performed by: NUCLEAR MEDICINE

## 2022-02-17 PROCEDURE — 1090F PRES/ABSN URINE INCON ASSESS: CPT | Performed by: NUCLEAR MEDICINE

## 2022-02-17 PROCEDURE — G8484 FLU IMMUNIZE NO ADMIN: HCPCS | Performed by: NUCLEAR MEDICINE

## 2022-02-17 PROCEDURE — 3017F COLORECTAL CA SCREEN DOC REV: CPT | Performed by: NUCLEAR MEDICINE

## 2022-02-17 PROCEDURE — 99213 OFFICE O/P EST LOW 20 MIN: CPT | Performed by: NUCLEAR MEDICINE

## 2022-02-17 PROCEDURE — 4040F PNEUMOC VAC/ADMIN/RCVD: CPT | Performed by: NUCLEAR MEDICINE

## 2022-02-17 PROCEDURE — G8427 DOCREV CUR MEDS BY ELIG CLIN: HCPCS | Performed by: NUCLEAR MEDICINE

## 2022-02-17 PROCEDURE — 1123F ACP DISCUSS/DSCN MKR DOCD: CPT | Performed by: NUCLEAR MEDICINE

## 2022-02-17 RX ORDER — LISINOPRIL 5 MG/1
5 TABLET ORAL 2 TIMES DAILY
Qty: 180 TABLET | Refills: 3 | Status: SHIPPED | OUTPATIENT
Start: 2022-02-17

## 2022-02-17 RX ORDER — LISINOPRIL 5 MG/1
5 TABLET ORAL 2 TIMES DAILY
COMMUNITY
End: 2022-02-17 | Stop reason: SDUPTHER

## 2022-02-17 NOTE — PROGRESS NOTES
[address] .  [address]  [facility] [address]  Dept: [phone]  Dept Fax: [phone]  Loc: [phone]    Visit Date: 2/17/2022    Kade Mitchell is a 66 y.o. female who presents todayfor:  Chief Complaint   Patient presents with    Check-Up    Cardiomyopathy    Coronary Artery Disease    Hypertension   cath done  Mild CAD  No chest pain  No changes in breathing  Some baseline dyspnea  No dizziness  No syncope  BP is stable  Known AI as well that we are following     HPI:  HPI  Past Medical History:   Diagnosis Date    [facility]     BCC, SCC    Hyperlipidemia     Hypertension       Past Surgical History:   Procedure Laterality Date    APPENDECTOMY      CHEST WALL RESECTION Right 2/10/2020    EXCISION SCC RIGHT MID CHEST performed by Peggy Davis MD at [address] [facility]      catarac bilat    HYSTERECTOMY  1988    OVARY REMOVAL Bilateral 1988    SKIN BIOPSY       Family History   Problem Relation Age of Onset    Heart Disease Mother     Heart Disease Brother     Breast Cancer Sister 64    Breast Cancer Sister 67    Heart Disease Brother     Breast Cancer Maternal Aunt 39     Social History     Tobacco Use    Smoking status: Never Smoker    Smokeless tobacco: Never Used   Substance Use Topics    Alcohol use:  Yes     Alcohol/week: 1.0 standard drink     Types: 1 Glasses of wine per week     Comment: occasionally      Current Outpatient Medications   Medication Sig Dispense Refill    METAMUCIL FIBER PO Take 15 mLs by mouth daily      metoprolol succinate (TOPROL XL) 25 MG extended release tablet Take 1 tablet by mouth daily 90 tablet 3    sertraline (ZOLOFT) 50 MG tablet Take 50 mg by mouth daily       Potassium 99 MG TABS Take 1 tablet by mouth daily      Calcium Carbonate-Vitamin D (CALCIUM 600+D HIGH POTENCY PO) Take 1 tablet by mouth 2 times daily       m)   Wt 171 lb (77.6 kg)   BMI 31.79 kg/m²   General:   Well developed, well nourished  Lungs:   Clear to auscultation  Heart:    Normal S1 S2, Slight murmur. no rubs, no gallops  Abdomen:   Soft, non tender, no organomegalies, positive bowel sounds  Extremities:   No edema, no cyanosis, good peripheral pulses  Neurological:   Awake, alert, oriented. No obvious focal deficits  Musculoskelatal:  No obvious deformities    Assessment:      Diagnosis Orders   1. Coronary artery disease involving native coronary artery of native heart without angina pectoris     2. Dilated cardiomyopathy ([facility]     as above  Cardiac fair for now   Monitoring the AI      Plan:  No follow-ups on file. Consider lisinopril 5 mg bid  Continue risk factor modification and medical management  Thank you for allowing me to participate in the care of your patient. Please don't hesitate to contact me regarding any further issues related to the patient care    Orders Placed:  No orders of the defined types were placed in this encounter. Medications Prescribed:  No orders of the defined types were placed in this encounter. Discussed use, benefit, and side effects of prescribed medications. All patient questions answered. Pt voicedunderstanding. Instructed to continue current medications, diet and exercise. Continue risk factor modification and medical management. Patient agreed with treatment plan. Follow up as directed.     Electronically signedby Rolando Grant MD on 2/17/2022 at 12:17 PM

## 2022-03-23 ENCOUNTER — HOSPITAL ENCOUNTER (OUTPATIENT)
Age: 72
Discharge: HOME | End: 2022-03-23
Payer: MEDICARE

## 2022-03-23 LAB
BACTERIA: ABNORMAL /HPF
BILIRUBIN URINE: NEGATIVE
BLOOD, URINE: ABNORMAL
CASTS 2: ABNORMAL /LPF
CASTS UA: ABNORMAL /LPF
CHARACTER, URINE: ABNORMAL
COLOR: ABNORMAL
CRYSTALS, UA: ABNORMAL
EPITHELIAL CELLS, UA: ABNORMAL /HPF
GLUCOSE URINE: NEGATIVE MG/DL
KETONES, URINE: NEGATIVE
LEUKOCYTE ESTERASE, URINE: ABNORMAL
MISCELLANEOUS 2: ABNORMAL
NITRITE, URINE: NEGATIVE
PH UA: 7 (ref 5–9)
PROTEIN UA: ABNORMAL
RBC URINE: ABNORMAL /HPF
RENAL EPITHELIAL, UA: ABNORMAL
SPECIFIC GRAVITY, URINE: 1.01 (ref 1–1.03)
UROBILINOGEN, URINE: 0.2 EU/DL (ref 0–1)
WBC UA: > 100 /HPF
YEAST: ABNORMAL

## 2022-03-23 PROCEDURE — 87086 URINE CULTURE/COLONY COUNT: CPT

## 2022-03-23 PROCEDURE — 87186 SC STD MICRODIL/AGAR DIL: CPT

## 2022-03-23 PROCEDURE — 87077 CULTURE AEROBIC IDENTIFY: CPT

## 2022-03-23 PROCEDURE — 81001 URINALYSIS AUTO W/SCOPE: CPT

## 2022-03-25 LAB
ORGANISM: ABNORMAL
URINE CULTURE REFLEX: ABNORMAL
URINE CULTURE REFLEX: ABNORMAL

## 2022-04-01 ENCOUNTER — HOSPITAL ENCOUNTER (OUTPATIENT)
Age: 72
Discharge: HOME | End: 2022-04-01
Payer: MEDICARE

## 2022-04-01 LAB
BACTERIA: ABNORMAL /HPF
BILIRUBIN URINE: NEGATIVE
BLOOD, URINE: ABNORMAL
CASTS 2: ABNORMAL /LPF
CASTS UA: ABNORMAL /LPF
CHARACTER, URINE: CLEAR
COLOR: YELLOW
CRYSTALS, UA: ABNORMAL
EPITHELIAL CELLS, UA: ABNORMAL /HPF
GLUCOSE URINE: NEGATIVE MG/DL
KETONES, URINE: NEGATIVE
LEUKOCYTE ESTERASE, URINE: NEGATIVE
MISCELLANEOUS 2: ABNORMAL
NITRITE, URINE: NEGATIVE
PH UA: 6.5 (ref 5–9)
PROTEIN UA: NEGATIVE
RBC URINE: ABNORMAL /HPF
RENAL EPITHELIAL, UA: ABNORMAL
SPECIFIC GRAVITY, URINE: < 1.005 (ref 1–1.03)
UROBILINOGEN, URINE: 0.2 EU/DL (ref 0–1)
WBC UA: ABNORMAL /HPF
YEAST: ABNORMAL

## 2022-04-01 PROCEDURE — 81001 URINALYSIS AUTO W/SCOPE: CPT

## 2022-06-24 ENCOUNTER — HOSPITAL ENCOUNTER (OUTPATIENT)
Age: 72
Discharge: HOME | End: 2022-06-24
Payer: MEDICARE

## 2022-06-24 LAB
ALBUMIN SERPL-MCNC: 4.2 G/DL (ref 3.5–5.1)
ALP BLD-CCNC: 51 U/L (ref 38–126)
ALT SERPL-CCNC: 15 U/L (ref 11–66)
ANION GAP SERPL CALCULATED.3IONS-SCNC: 9 MEQ/L (ref 8–16)
AST SERPL-CCNC: 18 U/L (ref 5–40)
BASOPHILS # BLD: 0.7 %
BASOPHILS ABSOLUTE: 0 THOU/MM3 (ref 0–0.1)
BILIRUB SERPL-MCNC: 0.4 MG/DL (ref 0.3–1.2)
BUN BLDV-MCNC: 12 MG/DL (ref 7–22)
CALCIUM SERPL-MCNC: 9.4 MG/DL (ref 8.5–10.5)
CHLORIDE BLD-SCNC: 102 MEQ/L (ref 98–111)
CHOLESTEROL, TOTAL: 221 MG/DL (ref 100–199)
CO2: 29 MEQ/L (ref 23–33)
CREAT SERPL-MCNC: 0.6 MG/DL (ref 0.4–1.2)
EOSINOPHIL # BLD: 1.9 %
EOSINOPHILS ABSOLUTE: 0.1 THOU/MM3 (ref 0–0.4)
ERYTHROCYTE [DISTWIDTH] IN BLOOD BY AUTOMATED COUNT: 13.6 % (ref 11.5–14.5)
ERYTHROCYTE [DISTWIDTH] IN BLOOD BY AUTOMATED COUNT: 47.8 FL (ref 35–45)
GFR SERPL CREATININE-BSD FRML MDRD: > 90 ML/MIN/1.73M2
GLUCOSE BLD-MCNC: 99 MG/DL (ref 70–108)
HCT VFR BLD CALC: 43.6 % (ref 37–47)
HDLC SERPL-MCNC: 62 MG/DL
HEMOGLOBIN: 14.1 GM/DL (ref 12–16)
HEPATITIS C ANTIBODY: NEGATIVE
IMMATURE GRANS (ABS): 0.02 THOU/MM3 (ref 0–0.07)
IMMATURE GRANULOCYTES: 0.4 %
LDL CHOLESTEROL CALCULATED: 136 MG/DL
LYMPHOCYTES # BLD: 29.3 %
LYMPHOCYTES ABSOLUTE: 1.6 THOU/MM3 (ref 1–4.8)
MCH RBC QN AUTO: 30.8 PG (ref 26–33)
MCHC RBC AUTO-ENTMCNC: 32.3 GM/DL (ref 32.2–35.5)
MCV RBC AUTO: 95.2 FL (ref 81–99)
MONOCYTES # BLD: 7.9 %
MONOCYTES ABSOLUTE: 0.4 THOU/MM3 (ref 0.4–1.3)
NUCLEATED RED BLOOD CELLS: 0 /100 WBC
PLATELET # BLD: 217 THOU/MM3 (ref 130–400)
PMV BLD AUTO: 11.5 FL (ref 9.4–12.4)
POTASSIUM SERPL-SCNC: 5.1 MEQ/L (ref 3.5–5.2)
RBC # BLD: 4.58 MILL/MM3 (ref 4.2–5.4)
SEG NEUTROPHILS: 59.8 %
SEGMENTED NEUTROPHILS ABSOLUTE COUNT: 3.2 THOU/MM3 (ref 1.8–7.7)
SODIUM BLD-SCNC: 140 MEQ/L (ref 135–145)
TOTAL PROTEIN: 6 G/DL (ref 6.1–8)
TRIGL SERPL-MCNC: 113 MG/DL (ref 0–199)
WBC # BLD: 5.4 THOU/MM3 (ref 4.8–10.8)

## 2022-06-24 PROCEDURE — 80061 LIPID PANEL: CPT

## 2022-06-24 PROCEDURE — 85025 COMPLETE CBC W/AUTO DIFF WBC: CPT

## 2022-06-24 PROCEDURE — 86803 HEPATITIS C AB TEST: CPT

## 2022-06-24 PROCEDURE — 36415 COLL VENOUS BLD VENIPUNCTURE: CPT

## 2022-06-24 PROCEDURE — 80053 COMPREHEN METABOLIC PANEL: CPT

## 2022-11-14 RX ORDER — METOPROLOL SUCCINATE 25 MG/1
25 TABLET, EXTENDED RELEASE ORAL DAILY
Qty: 90 TABLET | Refills: 0 | Status: SHIPPED | OUTPATIENT
Start: 2022-11-14

## 2022-11-14 NOTE — TELEPHONE ENCOUNTER
Pt needs refill on:    Rx: Metoprolol 25 mg    Pharmacy: [address] [facility]  [address] [facility]

## 2022-11-15 ENCOUNTER — HOSPITAL ENCOUNTER (OUTPATIENT)
Age: 72
Discharge: HOME | End: 2022-11-15
Payer: MEDICARE

## 2022-11-15 LAB
ANION GAP SERPL CALCULATED.3IONS-SCNC: 11 MEQ/L (ref 8–16)
BUN BLDV-MCNC: 8 MG/DL (ref 7–22)
CALCIUM SERPL-MCNC: 9.5 MG/DL (ref 8.5–10.5)
CHLORIDE BLD-SCNC: 100 MEQ/L (ref 98–111)
CO2: 28 MEQ/L (ref 23–33)
CREAT SERPL-MCNC: 0.6 MG/DL (ref 0.4–1.2)
EKG ATRIAL RATE: 59 BPM
EKG P AXIS: 71 DEGREES
EKG P-R INTERVAL: 152 MS
EKG Q-T INTERVAL: 414 MS
EKG QRS DURATION: 80 MS
EKG QTC CALCULATION (BAZETT): 409 MS
EKG R AXIS: 63 DEGREES
EKG T AXIS: 82 DEGREES
EKG VENTRICULAR RATE: 59 BPM
ERYTHROCYTE [DISTWIDTH] IN BLOOD BY AUTOMATED COUNT: 13.2 % (ref 11.5–14.5)
ERYTHROCYTE [DISTWIDTH] IN BLOOD BY AUTOMATED COUNT: 47 FL (ref 35–45)
GFR SERPL CREATININE-BSD FRML MDRD: > 60 ML/MIN/1.73M2
GLUCOSE BLD-MCNC: 83 MG/DL (ref 70–108)
HCT VFR BLD CALC: 43.2 % (ref 37–47)
HEMOGLOBIN: 13.8 GM/DL (ref 12–16)
MCH RBC QN AUTO: 31.2 PG (ref 26–33)
MCHC RBC AUTO-ENTMCNC: 31.9 GM/DL (ref 32.2–35.5)
MCV RBC AUTO: 97.5 FL (ref 81–99)
PLATELET # BLD: 224 THOU/MM3 (ref 130–400)
PMV BLD AUTO: 11.5 FL (ref 9.4–12.4)
POTASSIUM SERPL-SCNC: 4.6 MEQ/L (ref 3.5–5.2)
RBC # BLD: 4.43 MILL/MM3 (ref 4.2–5.4)
SODIUM BLD-SCNC: 139 MEQ/L (ref 135–145)
WBC # BLD: 8.2 THOU/MM3 (ref 4.8–10.8)

## 2022-11-15 PROCEDURE — 36415 COLL VENOUS BLD VENIPUNCTURE: CPT

## 2022-11-15 PROCEDURE — 80048 BASIC METABOLIC PNL TOTAL CA: CPT

## 2022-11-15 PROCEDURE — 93005 ELECTROCARDIOGRAM TRACING: CPT | Performed by: SPECIALIST

## 2022-11-15 PROCEDURE — 85027 COMPLETE CBC AUTOMATED: CPT

## 2022-11-15 PROCEDURE — 93010 ELECTROCARDIOGRAM REPORT: CPT | Performed by: INTERNAL MEDICINE

## 2022-11-16 NOTE — PROGRESS NOTES
In preparation for their surgical procedure above patient was screened for Obstructive Sleep Apnea (MAME) using the STOP-Bang Questionnaire by the Pre-Admission Testing department. This is a pre-surgical screening tool for patient safety and serves as a recommendation, this WILL NOT cause cancellation of surgery. STOP-Bang Questionnaire  * Do you currently see a pulmonologist?  No     If yes STOP, do not complete. Patient follows with Dr.     1.  Do you snore loudly (able to be heard in the next room)? No    2. Do you often feel tired or sleepy during the daytime? No       3. Has anyone ever told you that you stop breathing during your sleep? No    4. Do you have or are you being treated for high blood pressure? Yes      5. BMI more than 35? BMI (Calculated): 31.7        No    6. Age over 48 years? 74 y.o. Yes    7. Neck Circumference greater than 17 inches for male or 16 inches for female? Measured           (visits only)            Not Applicable    8. Gender Male? No      TOTAL SCORE: 2    MAME - Low Risk : Yes to 0 - 2 questions  MAME - Intermediate Risk : Yes to 3 - 4 questions  MAME - High Risk : Yes to 5 - 8 questions    Adapted from:   STOP Questionnaire: A Tool to Screen Patients for Obstructive Sleep Apnea   [facility] Mikie Hernandez M.B.B.S., Cheryl Meredith M.D., Arie Whitman. Brando Rodríguez, Ph.D., Taryn Vann M.B.B.S., Jessica Palomares, M.Sc., Candy Cramer M.D., [facility] Vargas. Rex Dancer, F.R.C.P.C.    Anesthesiology 2008; [phone] Copyright 2008, the [address] of [employer]esthesiologists, [facility], [place] [facility].   ----------------------------------------------------------------------------------------------------------------

## 2022-11-16 NOTE — PROGRESS NOTES
NPO after midnight  [employer] and drivers license  Wear comfortable clean clothing  Do not bring jewelry  Shower night before and morning of surgery with a liquid antibacterial soap  Bring list of medications with dosage and how often taken  Follow all instructions given by your physician   needed at discharge  Please limit to 2 visitors for surgery  You must have a responsible adult with you day of surgery and for 24 hours after surgery  Call [phone] for any questions

## 2022-11-23 ENCOUNTER — HOSPITAL ENCOUNTER (OUTPATIENT)
Age: 72
Setting detail: OUTPATIENT SURGERY
Discharge: HOME | End: 2022-11-23
Attending: SPECIALIST | Admitting: SPECIALIST
Payer: MEDICARE

## 2022-11-23 ENCOUNTER — ANESTHESIA (OUTPATIENT)
Dept: OPERATING ROOM | Age: 72
End: 2022-11-23
Payer: MEDICARE

## 2022-11-23 ENCOUNTER — ANESTHESIA EVENT (OUTPATIENT)
Dept: OPERATING ROOM | Age: 72
End: 2022-11-23
Payer: MEDICARE

## 2022-11-23 VITALS
WEIGHT: 169.8 LBS | SYSTOLIC BLOOD PRESSURE: 124 MMHG | RESPIRATION RATE: 16 BRPM | HEIGHT: 62 IN | HEART RATE: 57 BPM | BODY MASS INDEX: 31.25 KG/M2 | OXYGEN SATURATION: 99 % | DIASTOLIC BLOOD PRESSURE: 71 MMHG | TEMPERATURE: 96.6 F

## 2022-11-23 DIAGNOSIS — C44.622 SCC (SQUAMOUS CELL CARCINOMA), ARM, RIGHT: ICD-10-CM

## 2022-11-23 DIAGNOSIS — C44.622 SQUAMOUS CELL CARCINOMA OF ARM, RIGHT: Primary | ICD-10-CM

## 2022-11-23 PROCEDURE — 2500000003 HC RX 250 WO HCPCS: Performed by: SPECIALIST

## 2022-11-23 PROCEDURE — 7100000011 HC PHASE II RECOVERY - ADDTL 15 MIN: Performed by: SPECIALIST

## 2022-11-23 PROCEDURE — 3600000012 HC SURGERY LEVEL 2 ADDTL 15MIN: Performed by: SPECIALIST

## 2022-11-23 PROCEDURE — 2580000003 HC RX 258: Performed by: SPECIALIST

## 2022-11-23 PROCEDURE — 3700000000 HC ANESTHESIA ATTENDED CARE: Performed by: SPECIALIST

## 2022-11-23 PROCEDURE — 88331 PATH CONSLTJ SURG 1 BLK 1SPC: CPT

## 2022-11-23 PROCEDURE — 6360000002 HC RX W HCPCS: Performed by: SPECIALIST

## 2022-11-23 PROCEDURE — 3600000002 HC SURGERY LEVEL 2 BASE: Performed by: SPECIALIST

## 2022-11-23 PROCEDURE — 3700000001 HC ADD 15 MINUTES (ANESTHESIA): Performed by: SPECIALIST

## 2022-11-23 PROCEDURE — 7100000010 HC PHASE II RECOVERY - FIRST 15 MIN: Performed by: SPECIALIST

## 2022-11-23 PROCEDURE — 88305 TISSUE EXAM BY PATHOLOGIST: CPT

## 2022-11-23 PROCEDURE — 2709999900 HC NON-CHARGEABLE SUPPLY: Performed by: SPECIALIST

## 2022-11-23 RX ORDER — ACETAMINOPHEN AND CODEINE PHOSPHATE 300; 30 MG/1; MG/1
1 TABLET ORAL EVERY 6 HOURS PRN
Qty: 5 TABLET | Refills: 0 | Status: SHIPPED | OUTPATIENT
Start: 2022-11-23 | End: 2022-11-26

## 2022-11-23 RX ORDER — PROPOFOL 10 MG/ML
INJECTION, EMULSION INTRAVENOUS CONTINUOUS PRN
Status: DISCONTINUED | OUTPATIENT
Start: 2022-11-23 | End: 2022-11-23 | Stop reason: SDUPTHER

## 2022-11-23 RX ORDER — SODIUM CHLORIDE 9 MG/ML
INJECTION, SOLUTION INTRAVENOUS CONTINUOUS
Status: DISCONTINUED | OUTPATIENT
Start: 2022-11-23 | End: 2022-11-23 | Stop reason: HOSPADM

## 2022-11-23 RX ORDER — PROPOFOL 10 MG/ML
INJECTION, EMULSION INTRAVENOUS PRN
Status: DISCONTINUED | OUTPATIENT
Start: 2022-11-23 | End: 2022-11-23 | Stop reason: SDUPTHER

## 2022-11-23 RX ORDER — LIDOCAINE HYDROCHLORIDE AND EPINEPHRINE 20; 5 MG/ML; UG/ML
INJECTION, SOLUTION EPIDURAL; INFILTRATION; INTRACAUDAL; PERINEURAL PRN
Status: DISCONTINUED | OUTPATIENT
Start: 2022-11-23 | End: 2022-11-23 | Stop reason: ALTCHOICE

## 2022-11-23 RX ADMIN — CEFAZOLIN 2000 MG: 10 INJECTION, POWDER, FOR SOLUTION INTRAVENOUS at 15:03

## 2022-11-23 RX ADMIN — PROPOFOL 50 MCG/KG/MIN: 10 INJECTION, EMULSION INTRAVENOUS at 14:59

## 2022-11-23 RX ADMIN — SODIUM CHLORIDE: 9 INJECTION, SOLUTION INTRAVENOUS at 14:54

## 2022-11-23 RX ADMIN — PROPOFOL 50 MG: 10 INJECTION, EMULSION INTRAVENOUS at 14:56

## 2022-11-23 ASSESSMENT — PAIN - FUNCTIONAL ASSESSMENT: PAIN_FUNCTIONAL_ASSESSMENT: 0-10

## 2022-11-23 NOTE — PROGRESS NOTES
Dressing= upper right arm- benzoin and steries gauze, kerlix, ace  Lower  biopsy site - bacitracin , band aid

## 2022-11-23 NOTE — OP NOTE
Operative Note    Patient name: Zonia Alberto             Medical Record Number: [id number]    Primary Care Physician: Master Quan MD     1950    Date of Procedure: 2022    Pre-operative Diagnosis:  1.  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  8mm suspicious lesion of right distal radial forearm    Post-operative Diagnosis: Same    Procedure Performed:  1. Excision of right proximal forearm squamous cell carcinoma creating a 7cm2 defect that was repaired with an adjacent tissue transfer (25 cm2) (CPT 06839)      2.  8mm shave biopsy of right distal radial forearm (CPT 33544)    Surgeons/Assistants: Dr. Elvis Marshall, MD Landy Perez PA-C    Estimated Blood Loss: 5ml     Complications: none immediately appreciated    Procedure: With the patient lying in the supine position and under adequate anesthesia per the anesthesia team, the area was anesthetized with a total of 19 ml of 1% Lidocaine 1:100,000 with epinephrine solution. The area was then prepped and draped in the standard surgical fashion. The skin cancer was excised at 3cm diameter and sent for fresh frozen evaluation. Pathology called back to the room and stated indeed the margin were clear. This left a sizeable *7cm2) defect, which could not be closed primarily. Therefore, a 25cm2 (6cm x 3cm + 7cm2) sum of defect/adjacent tissue transfer rotation flap was then designed, back cut distally 6cm, elevated 3cm and inset with 3-0 Monocryl suture placed in interrupted buried fashion. The Burow's triangles were resected with final dressing being benzoin/steristrips. The shave biopsy was performed at 8mm, with final dressing being bacitracin and bandaid. The patient tolerated the procedure quite well and remained hemodynamically stable throughout the procedure and was quite comfortable throughout the operative course.     Clinical staging for cancer cases:  Ct  Cn  Cm    Phoebe Cone Danielle Sterling MD  Electronically signed by me on 11/23/2022 at 3:25 PM Operative Note      Patient: Osiel Purdy  YOB: 1950  MRN: [id number]    Date of Procedure: 11/23/2022    Pre-Op Diagnosis: SCC (squamous cell carcinoma), arm, right [C44.622]    Post-Op Diagnosis: Same       Procedure(s):  EXCISION SCC MODERATELY-DIFFERENTIATED RIGHT PROXIMAL FOREARM, AND SHAVE BIOPSY RIGHT RADIAL DISTAL FOREARM    Surgeon(s):  Dee Dee Cherry MD    Assistant:   Physician Assistant: Sreekanth Spears PA-C    Anesthesia: Monitor Anesthesia Care    Estimated Blood Loss (mL): Minimal    Complications: None    Specimens:   ID Type Source Tests Collected by Time Destination   A :  Tissue Arm SURGICAL PATHOLOGY Dee Dee Cherry MD 11/23/2022 1329    B :  Tissue Arm SURGICAL PATHOLOGY Dee Dee Cherry MD 11/23/2022 1501        Implants:  * No implants in log *      Drains: * No LDAs found *    Findings: 1.  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  8mm suspicious lesion of right distal radial forearm      Detailed Description of Procedure:   1.   Excision of right proximal forearm squamous cell carcinoma creating a 7cm2 defect that was repaired with an adjacent tissue transfer (25 cm2) (CPT 11902)      2.  8mm shave biopsy of right distal radial forearm (CPT 43835)    Electronically signed by Dee Dee Cherry MD on 11/23/2022 at 3:24 PM

## 2022-11-23 NOTE — ANESTHESIA PRE PROCEDURE
Department of Anesthesiology  Preprocedure Note       Name:  Malvin Villegas   Age:  74 y.o.  :  1950                                          MRN:  [id number]         Date:  2022      Surgeon: Shirley Walter):  Mark Corrales MD    Procedure: Procedure(s):  EXCISION SCC MODERATELY-DIFFERENTIATED RIGHT PROXIMAL FOREARM, AND SHAVE BIOPSY RIGHT RADIAL DISTAL FOREARM    Medications prior to admission:   Prior to Admission medications    Medication Sig Start Date End Date Taking? Authorizing Provider   metoprolol succinate (TOPROL XL) 25 MG extended release tablet Take 1 tablet by mouth daily 22   Nathanael Yuan MD   lisinopril (PRINIVIL;ZESTRIL) 5 MG tablet Take 1 tablet by mouth 2 times daily 22   Nathanael Yuan MD   METAMUCIL FIBER PO Take 15 mLs by mouth daily    Historical Provider, MD   sertraline (ZOLOFT) 50 MG tablet Take 50 mg by mouth daily     Historical Provider, MD   Potassium 99 MG TABS Take 1 tablet by mouth daily as needed    Historical Provider, MD   Calcium Carbonate-Vitamin D (CALCIUM 600+D HIGH POTENCY PO) Take 1 tablet by mouth 2 times daily     Historical Provider, MD   ferrous sulfate 325 (65 FE) MG tablet Take 325 mg by mouth daily (with breakfast). Historical Provider, MD   Multiple Vitamin (MULTI-VITAMIN PO) Take 1 tablet by mouth daily     Historical Provider, MD   simvastatin (ZOCOR) 20 MG tablet Take 20 mg by mouth nightly Stopped last week because of muscle weakness    Historical Provider, MD       Current medications:    Current Facility-Administered Medications   Medication Dose Route Frequency Provider Last Rate Last Admin    0.9 % sodium chloride infusion   IntraVENous Continuous Mark Corrales MD        ceFAZolin (ANCEF) 2000 mg in dextrose 5 % 50 mL IVPB  2,000 mg IntraVENous Once Mark Corrales MD           Allergies:     Allergies   Allergen Reactions    Lipitor Other (See Comments)     WEAK MUSCLES    Oxycodone-Acetaminophen Hallucinations       Problem List:    Patient Active Problem List   Diagnosis Code    Chest pain R07.9    Angina of effort ([facility] I20.8       Past Medical History:        Diagnosis Date    Cancer ([facility]     BCC, SCC    Hyperlipidemia     Hypertension     Leaky heart valve     Murmur     Prolonged emergence from general anesthesia        Past Surgical History:        Procedure Laterality Date    APPENDECTOMY  1989    BLADDER SURGERY  01/2021    for prolapsed bladder    CATARACT REMOVAL Bilateral     CHEST WALL RESECTION Right 02/10/2020    EXCISION SCC RIGHT MID CHEST performed by Morgan Silva MD at [address] (CERVIX STATUS UNKNOWN)  1989    KNEE ARTHROSCOPY Right 2020    meniscus tear    OVARY REMOVAL Bilateral 1988    SKIN BIOPSY         Social History:    Social History     Tobacco Use    Smoking status: Never    Smokeless tobacco: Never   Substance Use Topics    Alcohol use:  Yes     Alcohol/week: 1.0 standard drink     Types: 1 Glasses of wine per week     Comment: occasionally                                Counseling given: Not Answered      Vital Signs (Current):   Vitals:    11/16/22 1543 11/23/22 1241   BP:  124/61   Pulse:  67   Resp:  16   Temp:  (!) 96.7 °F (35.9 °C)   TempSrc:  Temporal   SpO2:  98%   Weight: 170 lb (77.1 kg) 169 lb 12.8 oz (77 kg)   Height: 5' 1.5\" (1.562 m) 5' 1.5\" (1.562 m)                                              BP Readings from Last 3 Encounters:   11/23/22 124/61   02/17/22 (!) 152/78   11/22/21 (!) 121/58       NPO Status: Time of last liquid consumption: 0730 (sip of water)                        Time of last solid consumption: 1930                        Date of last liquid consumption: 11/23/22                        Date of last solid food consumption: 11/22/22    BMI:   Wt Readings from Last 3 Encounters:   11/23/22 169 lb 12.8 oz (77 kg)   02/17/22 171 lb (77.6 kg)   11/22/21 170 lb (77.1 kg)     Body mass index is 31.56 kg/m². CBC:   Lab Results   Component Value Date/Time    WBC 8.2 11/15/2022 12:08 PM    RBC 4.43 11/15/2022 12:08 PM    HGB 13.8 11/15/2022 12:08 PM    HCT 43.2 11/15/2022 12:08 PM    MCV 97.5 11/15/2022 12:08 PM    RDW 14.1 02/14/2017 03:02 PM     11/15/2022 12:08 PM       CMP:   Lab Results   Component Value Date/Time     11/15/2022 12:08 PM    K 4.6 11/15/2022 12:08 PM    K 4.4 05/11/2021 09:12 AM     11/15/2022 12:08 PM    CO2 28 11/15/2022 12:08 PM    BUN 8 11/15/2022 12:08 PM    CREATININE 0.6 11/15/2022 12:08 PM    LABGLOM >60 11/15/2022 12:08 PM    GLUCOSE 83 11/15/2022 12:08 PM    PROT 6.0 06/24/2022 08:27 AM    CALCIUM 9.5 11/15/2022 12:08 PM    BILITOT 0.4 06/24/2022 08:27 AM    ALKPHOS 51 06/24/2022 08:27 AM    AST 18 06/24/2022 08:27 AM    ALT 15 06/24/2022 08:27 AM       POC Tests: No results for input(s): POCGLU, POCNA, POCK, POCCL, POCBUN, POCHEMO, POCHCT in the last 72 hours. Coags:   Lab Results   Component Value Date/Time    INR 0.95 05/11/2021 09:29 AM    APTT 33.9 11/22/2021 05:12 AM       HCG (If Applicable): No results found for: PREGTESTUR, PREGSERUM, HCG, HCGQUANT     ABGs: No results found for: PHART, PO2ART, ABX2JIF, HJX2LPX, BEART, B7RUNGQT     Type & Screen (If Applicable):  Lab Results   Component Value Date    LABRH POS 11/22/2021       Drug/Infectious Status (If Applicable):  Lab Results   Component Value Date/Time    HEPCAB Negative 06/24/2022 08:27 AM       COVID-19 Screening (If Applicable): No results found for: COVID19        Anesthesia Evaluation  Patient summary reviewed  Airway: Mallampati: II  TM distance: >3 FB   Neck ROM: full  Mouth opening: > = 3 FB   Dental:    (+) partials      Pulmonary:                              Cardiovascular:    (+) hypertension:,                   Neuro/Psych:               GI/Hepatic/Renal:             Endo/Other:                     Abdominal:             Vascular:           Other Findings:

## 2022-11-23 NOTE — PROGRESS NOTES
1543-Patient to Phase II via chair. Report received from [facility]. Patient awake and alert. Vitals obtained and stable. Ace wrap in place on right arm. No drainage noted. Patient denies pain and nausea. Instructed on call light use. Family in room. 1548-Patient provided with snack and drink. Denies needs. Call light in reach. 1610-Patient states she is ready to get dressed. IV removed with no complications. Patient getting dressed with assistance from . 1620-Discharge instructions reviewed. Verbalized understanding. 1625-Patient meets discharge criteria. Discharged in stable condition with responsible . All belongings given to patient. Patient ambulated to car with assistance from RN. Patient tolerated well.

## 2022-11-23 NOTE — H&P
[address] [address]  History and Physical Update    Pt Name: Hernan Leone  MRN: [id number]  YOB: 1950  Date of evaluation: 11/23/2022    I have examined the patient and reviewed the H&P/Consult and there are no changes to the patient or plans.       Yecenia Card MD  Electronically signed 11/23/2022 at 2:45 PM

## 2022-11-23 NOTE — ANESTHESIA POSTPROCEDURE EVALUATION
Department of Anesthesiology  Postprocedure Note    Patient: Mat Sanabria  MRN: [id number]  YOB: 1950  Date of evaluation: 11/23/2022      Procedure Summary     Date: 11/23/22 Room / Location: [facility]    Anesthesia Start: 5922 Anesthesia Stop: 0766    Procedure: EXCISION SCC MODERATELY-DIFFERENTIATED RIGHT PROXIMAL FOREARM, AND SHAVE BIOPSY RIGHT RADIAL DISTAL FOREARM (Right) Diagnosis:       SCC (squamous cell carcinoma), arm, right      (SCC (squamous cell carcinoma), arm, right [L61.642])    Surgeons: Rogelio Matamoros MD Responsible Provider: Paulina Goss DO    Anesthesia Type: MAC ASA Status: 2          Anesthesia Type: No value filed.     Esme Phase I:      Esme Phase II:        Anesthesia Post Evaluation    Patient location during evaluation: bedside  Patient participation: complete - patient participated  Level of consciousness: awake  Airway patency: patent  Nausea & Vomiting: no nausea  Complications: no  Cardiovascular status: hemodynamically stable  Respiratory status: acceptable  Hydration status: stable

## 2022-11-23 NOTE — DISCHARGE INSTRUCTIONS
POST OPERATIVE INSTRUCTION SHEET  SKIN TUMOR/LESION REMOVAL          Activity:    No strenuous activity for 48 hours  No activity that stresses the suture closure/incision  Regular diet  ABSOLUTELY NO NICOTINE OF ANY TYPE    Wound Care:  Leave pressure dressing on arm in place for 48 hours. After 48 hours, you may remove ace wrap and gauze. Leave white steri strips in place. Once the pressure dressing is removed, you may remove the band aid over the biopsy site. You may then leave the biopsy site open to air. Once the biopsy site is open to air, you should gently wash with soap and water using fingertips then apply Bacitracin 3 times a day for 4 days. DO NOT USE BACITRACIN LONGER THAN FOUR DAYS. Steri-strips are in place on your forearm, you should leave them over incision until they fall off. If they fall off prior to your follow up appointment, cover incision with a band-aid  Keep all incisions clean  You may shower 48 hours after the operation. Use fingertips only when washing around incision, no washcloth. Pat dry. Limitations:  No swimming, hot tub, sauna or soaking in a bathtub    Prescriptions: Take exactly as prescribed  Continue antibiotic as prescribed. May take extra-strength tylenol OR Tylenol #3. Do not take both. Follow-Up:  Call office on Monday, 11/28/2022 for an appointment in 2-3 weeks, unless otherwise instructed by Dr. Constance Lefort our office if you experience any of the following:   Develop a fever (temperature is greater than 100.5F)   Develop redness greater than 1 cm around incision or red streaks up  extremity   Have any excess bleeding/ increased drainage or swelling at the incision site    *Note:  Your pathology results will be reviewed with you at your scheduled follow-up appointment. *A prescription for Tylenol #3 has been sent to your pharmacy. Do not take Tylenol #3 and Tylenol together.

## 2022-12-20 ENCOUNTER — HOSPITAL ENCOUNTER (OUTPATIENT)
Dept: MAMMOGRAPHY | Age: 72
Discharge: HOME | End: 2022-12-20
Payer: MEDICARE

## 2022-12-20 DIAGNOSIS — Z12.31 VISIT FOR SCREENING MAMMOGRAM: ICD-10-CM

## 2022-12-20 PROCEDURE — 77063 BREAST TOMOSYNTHESIS BI: CPT

## 2023-02-06 RX ORDER — METOPROLOL SUCCINATE 25 MG/1
25 TABLET, EXTENDED RELEASE ORAL DAILY
Qty: 90 TABLET | Refills: 0 | Status: SHIPPED | OUTPATIENT
Start: 2023-02-06

## 2023-02-06 NOTE — TELEPHONE ENCOUNTER
[facility] called requesting a refill on the following medications:  Requested Prescriptions     Pending Prescriptions Disp Refills    metoprolol succinate (TOPROL XL) 25 MG extended release tablet 90 tablet 0     Sig: Take 1 tablet by mouth daily     Pharmacy verified: [facility] on [address] [facility]      Date of last visit: 2/17/2022  Date of next visit (if applicable): 8/36/3268

## 2023-02-23 ENCOUNTER — OFFICE VISIT (OUTPATIENT)
Dept: CARDIOLOGY CLINIC | Age: 73
End: 2023-02-23
Payer: MEDICARE

## 2023-02-23 VITALS
BODY MASS INDEX: 32.09 KG/M2 | DIASTOLIC BLOOD PRESSURE: 78 MMHG | HEIGHT: 62 IN | WEIGHT: 174.4 LBS | HEART RATE: 72 BPM | SYSTOLIC BLOOD PRESSURE: 128 MMHG

## 2023-02-23 DIAGNOSIS — I10 PRIMARY HYPERTENSION: ICD-10-CM

## 2023-02-23 DIAGNOSIS — I35.1 NONRHEUMATIC AORTIC VALVE INSUFFICIENCY: Primary | ICD-10-CM

## 2023-02-23 PROCEDURE — 1036F TOBACCO NON-USER: CPT | Performed by: NUCLEAR MEDICINE

## 2023-02-23 PROCEDURE — 3017F COLORECTAL CA SCREEN DOC REV: CPT | Performed by: NUCLEAR MEDICINE

## 2023-02-23 PROCEDURE — G8427 DOCREV CUR MEDS BY ELIG CLIN: HCPCS | Performed by: NUCLEAR MEDICINE

## 2023-02-23 PROCEDURE — G8484 FLU IMMUNIZE NO ADMIN: HCPCS | Performed by: NUCLEAR MEDICINE

## 2023-02-23 PROCEDURE — 1090F PRES/ABSN URINE INCON ASSESS: CPT | Performed by: NUCLEAR MEDICINE

## 2023-02-23 PROCEDURE — G8417 CALC BMI ABV UP PARAM F/U: HCPCS | Performed by: NUCLEAR MEDICINE

## 2023-02-23 PROCEDURE — 1123F ACP DISCUSS/DSCN MKR DOCD: CPT | Performed by: NUCLEAR MEDICINE

## 2023-02-23 PROCEDURE — 3078F DIAST BP <80 MM HG: CPT | Performed by: NUCLEAR MEDICINE

## 2023-02-23 PROCEDURE — 99213 OFFICE O/P EST LOW 20 MIN: CPT | Performed by: NUCLEAR MEDICINE

## 2023-02-23 PROCEDURE — 3074F SYST BP LT 130 MM HG: CPT | Performed by: NUCLEAR MEDICINE

## 2023-02-23 PROCEDURE — G8400 PT W/DXA NO RESULTS DOC: HCPCS | Performed by: NUCLEAR MEDICINE

## 2023-02-23 RX ORDER — METOPROLOL SUCCINATE 25 MG/1
25 TABLET, EXTENDED RELEASE ORAL DAILY
Qty: 90 TABLET | Refills: 3 | Status: SHIPPED | OUTPATIENT
Start: 2023-02-23

## 2023-02-23 RX ORDER — LISINOPRIL 5 MG/1
5 TABLET ORAL 2 TIMES DAILY
Qty: 180 TABLET | Refills: 3 | Status: SHIPPED | OUTPATIENT
Start: 2023-02-23

## 2023-02-23 NOTE — PROGRESS NOTES
[address]  [address]  [facility] [address]  Dept: [phone]  Dept Fax: [phone]  Loc: [phone]    Visit Date: 2/23/2023    Lloyd Atkinson is a 74 y.o. female who presents todayfor:  Chief Complaint   Patient presents with    1 Year Follow Up    Valvular Heart Disease    Hypertension     Palpitation is better   No chest pain   Cath before mild CAD   is followed   No dizziness  No syncope  No changes in breathing  Dyspne on exertion     HPI:  HPI  Past Medical History:   Diagnosis Date    Cancer ([facility] [address]     BCC, SCC    Hyperlipidemia     Hypertension     Leaky heart valve     Murmur     Prolonged emergence from general anesthesia       Past Surgical History:   Procedure Laterality Date    APPENDECTOMY  1989    ARM SURGERY Right 11/23/2022    EXCISION SCC MODERATELY-DIFFERENTIATED RIGHT PROXIMAL FOREARM, AND SHAVE BIOPSY RIGHT RADIAL DISTAL FOREARM performed [facility]rien [facility] [address] [facility]  01/2021    for prolapsed bladder    CATARACT REMOVAL Bilateral     CHEST WALL RESECTION Right 02/10/2020    EXCISION SCC RIGHT MID CHEST performed by Naomi Obrien MD at [address] [facility] (CERVIX STATUS UNKNOWN)  1989    KNEE ARTHROSCOPY Right 2020    meniscus tear    OVARY REMOVAL Bilateral 1988    SKIN BIOPSY       Family History   Problem Relation Age of Onset    Depression Mother     Heart Disease Mother     Breast Cancer Sister 64    Breast Cancer Sister 67    High Blood Pressure Brother     Heart Disease Brother     High Blood Pressure Brother     Heart Disease Brother     Breast Cancer Maternal Aunt 45    Diabetes Neg Hx     Stroke Neg Hx      Social History     Tobacco Use    Smoking status: Never    Smokeless tobacco: Never   Substance Use Topics    Alcohol use:  Yes     Alcohol/week: 1.0 standard drink     Types: 1 Glasses of wine per week     Comment: occasionally      Current Outpatient Medications   Medication Sig Dispense Refill    metoprolol succinate (TOPROL XL) 25 MG extended release tablet Take 1 tablet by mouth daily 90 tablet 0    lisinopril (PRINIVIL;ZESTRIL) 5 MG tablet Take 1 tablet by mouth 2 times daily 180 tablet 3    METAMUCIL FIBER PO Take 15 mLs by mouth daily      sertraline (ZOLOFT) 50 MG tablet Take 50 mg by mouth daily       Potassium 99 MG TABS Take 1 tablet by mouth daily as needed      Calcium Carbonate-Vitamin D (CALCIUM 600+D HIGH POTENCY PO) Take 1 tablet by mouth 2 times daily       ferrous sulfate 325 (65 FE) MG tablet Take 325 mg by mouth daily (with breakfast). Multiple Vitamin (MULTI-VITAMIN PO) Take 1 tablet by mouth daily       simvastatin (ZOCOR) 20 MG tablet Take 20 mg by mouth nightly Stopped last week because of muscle weakness       No current facility-administered medications for this visit.      Allergies   Allergen Reactions    Lipitor Other (See Comments)     WEAK MUSCLES    Oxycodone-Acetaminophen Hallucinations     Health Maintenance   Topic Date Due    Depression Screen  Never done    DTaP/Tdap/Td vaccine (1 - Tdap) Never done    Colorectal Cancer Screen  Never done    Shingles vaccine (1 of 2) Never done    Pneumococcal 65+ years Vaccine (1 - PCV) Never done    Annual Wellness Visit (AWV)  Never done    Lipids  06/24/2023    Breast cancer screen  12/20/2023    DEXA (modify frequency per FRAX score)  Completed    Flu vaccine  Completed    COVID-19 Vaccine  Completed    Hepatitis C screen  Completed    Hepatitis A vaccine  Aged Out    Hib vaccine  Aged Out    Meningococcal (ACWY) vaccine  Aged Out       Subjective:  General:   No fever, no chills, some fatigue or weight loss  Pulmonary:    some dyspnea, no wheezing  Cardiac:    Denies recent chest pain,   GI:     No nausea or vomiting, no abdominal pain  Neuro:    No dizziness or light headedness,   Musculoskeletal:  No recent active issues  Extremities:   No edema, no obvious claudication       Objective:  General:   Well developed, well nourished  Lungs:   Clear to auscultation  Heart:    Normal S1 S2, Slight murmur. no rubs, no gallops  Abdomen:   Soft, non tender, no organomegalies, positive bowel sounds  Extremities:   No edema, no cyanosis, good peripheral pulses  Neurological:   Awake, alert, oriented. No obvious focal deficits  Musculoskelatal:  No obvious deformities   /78   Pulse 72   Ht 5' 1.5\" (1.562 m)   Wt 174 lb 6.4 oz (79.1 kg)   BMI 32.42 kg/m²     Assessment:      Diagnosis Orders   1. Nonrheumatic aortic valve insufficiency        2. Primary hypertension        As above  Cardiac seems fair     Plan:  No follow-ups on file. As above  Continue risk factor modification and medical management  Thank you for allowing me to participate in the care of your patient. Please don't hesitate to contact me regarding any further issues related to the patient care    Orders Placed:  No orders of the defined types were placed in this encounter. Prescribed:  No orders of the defined types were placed in this encounter. Discussed use, benefit, and side effects of prescribed medications. All patient questions answered. Pt voicedunderstanding. Instructed to continue current medications, diet and exercise. Continue risk factor modification and medical management. Patient agreed with treatment plan. Follow up as directed.     Electronically signedby Will Garzon MD on 2/23/2023 at 12:38 PM

## 2023-08-22 ENCOUNTER — TELEPHONE (OUTPATIENT)
Dept: FAMILY MEDICINE CLINIC | Age: 73
End: 2023-08-22

## 2023-08-22 NOTE — TELEPHONE ENCOUNTER
Pt stopped in requesting to become a new patient of [facility]. She was informed that [facility] currently is not accepting new patient's and could schedule with an NP. She declined and requested for a message to be put in.      Best number to call the patient is [phone]

## 2023-09-26 ENCOUNTER — OFFICE VISIT (OUTPATIENT)
Dept: FAMILY MEDICINE CLINIC | Age: 73
End: 2023-09-26
Payer: MEDICARE

## 2023-09-26 VITALS
HEIGHT: 61 IN | WEIGHT: 175 LBS | DIASTOLIC BLOOD PRESSURE: 66 MMHG | SYSTOLIC BLOOD PRESSURE: 120 MMHG | TEMPERATURE: 97.9 F | HEART RATE: 84 BPM | BODY MASS INDEX: 33.04 KG/M2 | RESPIRATION RATE: 16 BRPM

## 2023-09-26 DIAGNOSIS — I10 PRIMARY HYPERTENSION: Primary | ICD-10-CM

## 2023-09-26 DIAGNOSIS — E78.49 OTHER HYPERLIPIDEMIA: ICD-10-CM

## 2023-09-26 DIAGNOSIS — E66.09 CLASS 1 OBESITY DUE TO EXCESS CALORIES WITH SERIOUS COMORBIDITY AND BODY MASS INDEX (BMI) OF 32.0 TO 32.9 IN ADULT: ICD-10-CM

## 2023-09-26 DIAGNOSIS — I42.0 DILATED CARDIOMYOPATHY (HCC): ICD-10-CM

## 2023-09-26 DIAGNOSIS — I20.89 ANGINA OF EFFORT: ICD-10-CM

## 2023-09-26 PROCEDURE — 1123F ACP DISCUSS/DSCN MKR DOCD: CPT | Performed by: FAMILY MEDICINE

## 2023-09-26 PROCEDURE — 1090F PRES/ABSN URINE INCON ASSESS: CPT | Performed by: FAMILY MEDICINE

## 2023-09-26 PROCEDURE — G8400 PT W/DXA NO RESULTS DOC: HCPCS | Performed by: FAMILY MEDICINE

## 2023-09-26 PROCEDURE — 99204 OFFICE O/P NEW MOD 45 MIN: CPT | Performed by: FAMILY MEDICINE

## 2023-09-26 PROCEDURE — 3017F COLORECTAL CA SCREEN DOC REV: CPT | Performed by: FAMILY MEDICINE

## 2023-09-26 PROCEDURE — 3078F DIAST BP <80 MM HG: CPT | Performed by: FAMILY MEDICINE

## 2023-09-26 PROCEDURE — G8427 DOCREV CUR MEDS BY ELIG CLIN: HCPCS | Performed by: FAMILY MEDICINE

## 2023-09-26 PROCEDURE — 1036F TOBACCO NON-USER: CPT | Performed by: FAMILY MEDICINE

## 2023-09-26 PROCEDURE — 3074F SYST BP LT 130 MM HG: CPT | Performed by: FAMILY MEDICINE

## 2023-09-26 PROCEDURE — G8417 CALC BMI ABV UP PARAM F/U: HCPCS | Performed by: FAMILY MEDICINE

## 2023-09-26 RX ORDER — LANOLIN ALCOHOL/MO/W.PET/CERES
1000 CREAM (GRAM) TOPICAL DAILY
COMMUNITY

## 2023-09-26 RX ORDER — GLUCOSAMINE/D3/BOSWELLIA SERRA 1500MG-400
TABLET ORAL DAILY
COMMUNITY

## 2023-09-26 SDOH — ECONOMIC STABILITY: HOUSING INSECURITY
IN THE LAST 12 MONTHS, WAS THERE A TIME WHEN YOU DID NOT HAVE A STEADY PLACE TO SLEEP OR SLEPT IN A SHELTER (INCLUDING NOW)?: NO

## 2023-09-26 SDOH — ECONOMIC STABILITY: INCOME INSECURITY: HOW HARD IS IT FOR YOU TO PAY FOR THE VERY BASICS LIKE FOOD, HOUSING, MEDICAL CARE, AND HEATING?: NOT HARD AT ALL

## 2023-09-26 SDOH — ECONOMIC STABILITY: FOOD INSECURITY: WITHIN THE PAST 12 MONTHS, YOU WORRIED THAT YOUR FOOD WOULD RUN OUT BEFORE YOU GOT MONEY TO BUY MORE.: NEVER TRUE

## 2023-09-26 SDOH — ECONOMIC STABILITY: FOOD INSECURITY: WITHIN THE PAST 12 MONTHS, THE FOOD YOU BOUGHT JUST DIDN'T LAST AND YOU DIDN'T HAVE MONEY TO GET MORE.: NEVER TRUE

## 2023-09-26 ASSESSMENT — PATIENT HEALTH QUESTIONNAIRE - PHQ9
SUM OF ALL RESPONSES TO PHQ QUESTIONS 1-9: 0
1. LITTLE INTEREST OR PLEASURE IN DOING THINGS: 0
2. FEELING DOWN, DEPRESSED OR HOPELESS: 0
SUM OF ALL RESPONSES TO PHQ QUESTIONS 1-9: 0
SUM OF ALL RESPONSES TO PHQ QUESTIONS 1-9: 0
SUM OF ALL RESPONSES TO PHQ9 QUESTIONS 1 & 2: 0
SUM OF ALL RESPONSES TO PHQ QUESTIONS 1-9: 0

## 2023-09-26 NOTE — PROGRESS NOTES
Chief Complaint   Patient presents with    New Patient     Here today as a new patient to get established. Previous PCP Dr. Yovani Jimenes. Fito Turpin is a 74 y. o.female      Pt presents to establish care. Previous PCP was Dr. Yovani Jimenes. She has a history of hypertension, hyperlipidemia, and dilated cardiomyopathy. She sees a cardiologist on a regular basis. She states that her blood pressures have been stable. She also follows up with dermatology and plastic surgery on a regular basis due to her history of skin cancers. She takes her prescribed medications as directed and denies side effects. No recent illnesses or hospitalizations. Non-smoker. BMI 33.61.       Current medical problems include:  Patient Active Problem List   Diagnosis    Angina of effort ([address])    Dilated cardiomyopathy ([address])    Primary hypertension    Other hyperlipidemia    Class 1 obesity due to excess calories with serious comorbidity and body mass index (BMI) of 32.0 to 32.9 in adult             Past Medical History:   Diagnosis Date    Cancer ([address])     BCC, SCC    Hyperlipidemia     Hypertension     Leaky heart valve     Murmur     Prolonged emergence from general anesthesia     Sepsis ([address]) 2008       Past Surgical History:   Procedure Laterality Date    APPENDECTOMY  1989    ARM SURGERY Right 11/23/2022    EXCISION SCC MODERATELY-DIFFERENTIATED RIGHT PROXIMAL FOREARM, AND SHAVE BIOPSY RIGHT RADIAL DISTAL FOREARM performed by Lavern Dyer MD at [address] [place] [address]  01/2021    for prolapsed bladder    CATARACT REMOVAL Bilateral     CHEST WALL RESECTION Right 02/10/2020    EXCISION SCC RIGHT MID CHEST performed by Lavern Dyer MD at [address] [place] [address]  09/19/2023    Left face--Dr. Valencia    HYSTERECTOMY (CERVIX STATUS UNKNOWN)  1989    KNEE ARTHROSCOPY Right 2020    meniscus tear    OVARY REMOVAL Bilateral 1988    RECTOCELE REPAIR  01/21/2022

## 2023-09-27 ENCOUNTER — HOSPITAL ENCOUNTER (OUTPATIENT)
Age: 73
Discharge: HOME | End: 2023-09-27
Payer: MEDICARE

## 2023-09-27 DIAGNOSIS — I10 PRIMARY HYPERTENSION: ICD-10-CM

## 2023-09-27 DIAGNOSIS — E78.49 OTHER HYPERLIPIDEMIA: ICD-10-CM

## 2023-09-27 PROBLEM — E66.09 CLASS 1 OBESITY DUE TO EXCESS CALORIES WITH SERIOUS COMORBIDITY AND BODY MASS INDEX (BMI) OF 32.0 TO 32.9 IN ADULT: Status: ACTIVE | Noted: 2023-09-27

## 2023-09-27 PROBLEM — I42.0 DILATED CARDIOMYOPATHY (HCC): Status: ACTIVE | Noted: 2023-09-27

## 2023-09-27 PROBLEM — E66.811 CLASS 1 OBESITY DUE TO EXCESS CALORIES WITH SERIOUS COMORBIDITY AND BODY MASS INDEX (BMI) OF 32.0 TO 32.9 IN ADULT: Status: ACTIVE | Noted: 2023-09-27

## 2023-09-27 LAB
ALBUMIN SERPL BCG-MCNC: 4 G/DL (ref 3.5–5.1)
ALP SERPL-CCNC: 44 U/L (ref 38–126)
ALT SERPL W/O P-5'-P-CCNC: 15 U/L (ref 11–66)
ANION GAP SERPL CALC-SCNC: 10 MEQ/L (ref 8–16)
AST SERPL-CCNC: 19 U/L (ref 5–40)
BASOPHILS ABSOLUTE: 0 THOU/MM3 (ref 0–0.1)
BASOPHILS NFR BLD AUTO: 0.6 %
BILIRUB SERPL-MCNC: 0.5 MG/DL (ref 0.3–1.2)
BUN SERPL-MCNC: 7 MG/DL (ref 7–22)
CALCIUM SERPL-MCNC: 9.1 MG/DL (ref 8.5–10.5)
CHLORIDE SERPL-SCNC: 102 MEQ/L (ref 98–111)
CHOLEST SERPL-MCNC: 169 MG/DL (ref 100–199)
CO2 SERPL-SCNC: 27 MEQ/L (ref 23–33)
CREAT SERPL-MCNC: 0.7 MG/DL (ref 0.4–1.2)
DEPRECATED RDW RBC AUTO: 47 FL (ref 35–45)
EOSINOPHIL NFR BLD AUTO: 1.6 %
EOSINOPHILS ABSOLUTE: 0.1 THOU/MM3 (ref 0–0.4)
ERYTHROCYTE [DISTWIDTH] IN BLOOD BY AUTOMATED COUNT: 13.1 % (ref 11.5–14.5)
GFR SERPL CREATININE-BSD FRML MDRD: > 60 ML/MIN/1.73M2
GLUCOSE SERPL-MCNC: 103 MG/DL (ref 70–108)
HCT VFR BLD AUTO: 44.2 % (ref 37–47)
HDLC SERPL-MCNC: 60 MG/DL
HGB BLD-MCNC: 14.2 GM/DL (ref 12–16)
IMM GRANULOCYTES # BLD AUTO: 0.03 THOU/MM3 (ref 0–0.07)
IMM GRANULOCYTES NFR BLD AUTO: 0.6 %
LDLC SERPL CALC-MCNC: 86 MG/DL
LYMPHOCYTES ABSOLUTE: 1.4 THOU/MM3 (ref 1–4.8)
LYMPHOCYTES NFR BLD AUTO: 29.2 %
MCH RBC QN AUTO: 31.6 PG (ref 26–33)
MCHC RBC AUTO-ENTMCNC: 32.1 GM/DL (ref 32.2–35.5)
MCV RBC AUTO: 98.2 FL (ref 81–99)
MONOCYTES ABSOLUTE: 0.4 THOU/MM3 (ref 0.4–1.3)
MONOCYTES NFR BLD AUTO: 8.4 %
NEUTROPHILS NFR BLD AUTO: 59.6 %
NRBC BLD AUTO-RTO: 0 /100 WBC
PLATELET # BLD AUTO: 221 THOU/MM3 (ref 130–400)
PMV BLD AUTO: 11.7 FL (ref 9.4–12.4)
POTASSIUM SERPL-SCNC: 4.2 MEQ/L (ref 3.5–5.2)
PROT SERPL-MCNC: 6.1 G/DL (ref 6.1–8)
RBC # BLD AUTO: 4.5 MILL/MM3 (ref 4.2–5.4)
SEGMENTED NEUTROPHILS ABSOLUTE COUNT: 2.9 THOU/MM3 (ref 1.8–7.7)
SODIUM SERPL-SCNC: 139 MEQ/L (ref 135–145)
TRIGL SERPL-MCNC: 114 MG/DL (ref 0–199)
WBC # BLD AUTO: 4.9 THOU/MM3 (ref 4.8–10.8)

## 2023-09-27 PROCEDURE — 36415 COLL VENOUS BLD VENIPUNCTURE: CPT

## 2023-09-27 PROCEDURE — 85025 COMPLETE CBC W/AUTO DIFF WBC: CPT

## 2023-09-27 PROCEDURE — 80061 LIPID PANEL: CPT

## 2023-09-27 PROCEDURE — 80053 COMPREHEN METABOLIC PANEL: CPT

## 2023-09-27 ASSESSMENT — ENCOUNTER SYMPTOMS
DIARRHEA: 0
NAUSEA: 0
EYES NEGATIVE: 1
BLOOD IN STOOL: 0
VOMITING: 0
ABDOMINAL PAIN: 0
SHORTNESS OF BREATH: 0

## 2024-01-22 ENCOUNTER — OFFICE VISIT (OUTPATIENT)
Dept: ENT CLINIC | Age: 74
End: 2024-01-22
Payer: MEDICARE

## 2024-01-22 VITALS
BODY MASS INDEX: 32.21 KG/M2 | HEART RATE: 74 BPM | TEMPERATURE: 97.5 F | DIASTOLIC BLOOD PRESSURE: 80 MMHG | WEIGHT: 170.6 LBS | OXYGEN SATURATION: 98 % | SYSTOLIC BLOOD PRESSURE: 142 MMHG | HEIGHT: 61 IN

## 2024-01-22 DIAGNOSIS — R09.81 NASAL CONGESTION: ICD-10-CM

## 2024-01-22 DIAGNOSIS — L29.9 ITCHING OF EAR: ICD-10-CM

## 2024-01-22 DIAGNOSIS — R44.8 FACIAL PRESSURE: ICD-10-CM

## 2024-01-22 DIAGNOSIS — J34.89 CONCHA BULLOSA: ICD-10-CM

## 2024-01-22 DIAGNOSIS — M95.0 NASAL VALVE COLLAPSE: ICD-10-CM

## 2024-01-22 DIAGNOSIS — J30.0 VASOMOTOR RHINITIS: Primary | ICD-10-CM

## 2024-01-22 PROBLEM — I50.22 CHRONIC SYSTOLIC (CONGESTIVE) HEART FAILURE (HCC): Status: ACTIVE | Noted: 2024-01-22

## 2024-01-22 PROCEDURE — 3017F COLORECTAL CA SCREEN DOC REV: CPT | Performed by: PHYSICIAN ASSISTANT

## 2024-01-22 PROCEDURE — G8427 DOCREV CUR MEDS BY ELIG CLIN: HCPCS | Performed by: PHYSICIAN ASSISTANT

## 2024-01-22 PROCEDURE — G8400 PT W/DXA NO RESULTS DOC: HCPCS | Performed by: PHYSICIAN ASSISTANT

## 2024-01-22 PROCEDURE — G8484 FLU IMMUNIZE NO ADMIN: HCPCS | Performed by: PHYSICIAN ASSISTANT

## 2024-01-22 PROCEDURE — 1123F ACP DISCUSS/DSCN MKR DOCD: CPT | Performed by: PHYSICIAN ASSISTANT

## 2024-01-22 PROCEDURE — 1036F TOBACCO NON-USER: CPT | Performed by: PHYSICIAN ASSISTANT

## 2024-01-22 PROCEDURE — 3077F SYST BP >= 140 MM HG: CPT | Performed by: PHYSICIAN ASSISTANT

## 2024-01-22 PROCEDURE — 99204 OFFICE O/P NEW MOD 45 MIN: CPT | Performed by: PHYSICIAN ASSISTANT

## 2024-01-22 PROCEDURE — 1090F PRES/ABSN URINE INCON ASSESS: CPT | Performed by: PHYSICIAN ASSISTANT

## 2024-01-22 PROCEDURE — G8417 CALC BMI ABV UP PARAM F/U: HCPCS | Performed by: PHYSICIAN ASSISTANT

## 2024-01-22 PROCEDURE — 3079F DIAST BP 80-89 MM HG: CPT | Performed by: PHYSICIAN ASSISTANT

## 2024-01-22 RX ORDER — IPRATROPIUM BROMIDE 21 UG/1
2 SPRAY, METERED NASAL 3 TIMES DAILY PRN
Qty: 30 ML | Refills: 3 | Status: SHIPPED | OUTPATIENT
Start: 2024-01-22

## 2024-01-22 NOTE — PATIENT INSTRUCTIONS
-Vasomotor rhinitis  -Try using about 3 drops of sweet oil to the itchy ear as needed. Lay on your side with your ear up for about 5-10 minutes to allow them to soak in

## 2024-01-22 NOTE — PROGRESS NOTES
Oral cavity mucosa normal, no masses or lesions noted. Oropharynx is clear and moist. Tonsils symmetric, without erythema or exudate. Small tonsillith in crypt on the right. Hard and soft palate symmetrical and intact  Eyes:  Pupils are equal, round, and reactive to light. Conjunctivae and EOM are normal.   Neck:  Normal range of motion. Neck supple. No JVD present. No tracheal deviation present. No thyromegaly present. No cervical lymphadenopathy or neck masses/lesion noted with palpation. Parotid and submandibular glands normal and symmetric with palpation.  Cardiovascular:  Normal rate.   Pulmonary/Chest:  Effort normal. No stridor or stertor. No respiratory distress.   Musculoskeletal:  Normal range of motion. No edema or lymphadenopathy.  Neurological:  Alert and answers questions appropriately, cooperative with exam.   Cranial nerve II-XII grossly intact.  Skin:  Skin is warm. No erythema.   Psychiatric:  Normal mood and affect. Behavior is normal.     Data:    All of the past medical history, past surgical history, family history, social history, allergies and current medications were reviewed. This includes notes from referring provider(s) and associated labs/imaging.    CBC with Differential:    Lab Results   Component Value Date/Time    WBC 4.9 09/27/2023 07:56 AM    RBC 4.50 09/27/2023 07:56 AM    HGB 14.2 09/27/2023 07:56 AM    HCT 44.2 09/27/2023 07:56 AM     09/27/2023 07:56 AM    MCV 98.2 09/27/2023 07:56 AM    MCH 31.6 09/27/2023 07:56 AM    MCHC 32.1 09/27/2023 07:56 AM    RDW 14.1 02/14/2017 03:02 PM    NRBC 0 09/27/2023 07:56 AM    SEGSPCT 59.6 09/27/2023 07:56 AM    MONOPCT 8.4 09/27/2023 07:56 AM    MONOSABS 0.4 09/27/2023 07:56 AM    LYMPHSABS 1.4 09/27/2023 07:56 AM    EOSABS 0.1 09/27/2023 07:56 AM    BASOSABS 0.0 09/27/2023 07:56 AM     CMP:    Lab Results   Component Value Date/Time     09/27/2023 07:56 AM    K 4.2 09/27/2023 07:56 AM    K 4.4 05/11/2021 09:12 AM

## 2024-02-23 ENCOUNTER — OFFICE VISIT (OUTPATIENT)
Dept: CARDIOLOGY CLINIC | Age: 74
End: 2024-02-23

## 2024-02-23 VITALS
DIASTOLIC BLOOD PRESSURE: 70 MMHG | WEIGHT: 174 LBS | HEART RATE: 72 BPM | BODY MASS INDEX: 32.85 KG/M2 | HEIGHT: 61 IN | SYSTOLIC BLOOD PRESSURE: 138 MMHG

## 2024-02-23 DIAGNOSIS — R06.09 DYSPNEA ON EXERTION: ICD-10-CM

## 2024-02-23 DIAGNOSIS — I35.1 NONRHEUMATIC AORTIC VALVE INSUFFICIENCY: Primary | ICD-10-CM

## 2024-02-23 DIAGNOSIS — I10 PRIMARY HYPERTENSION: ICD-10-CM

## 2024-02-23 RX ORDER — LISINOPRIL 5 MG/1
5 TABLET ORAL 2 TIMES DAILY
Qty: 180 TABLET | Refills: 3 | Status: SHIPPED | OUTPATIENT
Start: 2024-02-23

## 2024-02-23 RX ORDER — METOPROLOL SUCCINATE 25 MG/1
25 TABLET, EXTENDED RELEASE ORAL DAILY
Qty: 90 TABLET | Refills: 3 | Status: SHIPPED | OUTPATIENT
Start: 2024-02-23

## 2024-02-23 NOTE — PROGRESS NOTES
Stroke Neg Hx      Social History     Tobacco Use    Smoking status: Never    Smokeless tobacco: Never   Substance Use Topics    Alcohol use: Yes     Alcohol/week: 1.0 standard drink of alcohol     Types: 1 Glasses of wine per week     Comment: occasionally      Current Outpatient Medications   Medication Sig Dispense Refill    ipratropium (ATROVENT) 0.03 % nasal spray 2 sprays by Nasal route 3 times daily as needed for Rhinitis (runny nose) Stop use if it seems to affect your eyes, call your eye doctor or go to ER with changes in vision 30 mL 3    vitamin B-12 (CYANOCOBALAMIN) 1000 MCG tablet Take 1 tablet by mouth daily      Biotin 40062 MCG TABS Take by mouth daily      lisinopril (PRINIVIL;ZESTRIL) 5 MG tablet Take 1 tablet by mouth 2 times daily 180 tablet 3    metoprolol succinate (TOPROL XL) 25 MG extended release tablet Take 1 tablet by mouth daily 90 tablet 3    METAMUCIL FIBER PO Take 15 mLs by mouth daily      Potassium 99 MG TABS Take 1 tablet by mouth daily      Calcium Carbonate-Vitamin D (CALCIUM 600+D HIGH POTENCY PO) Take 1 tablet by mouth 2 times daily       ferrous sulfate 325 (65 FE) MG tablet Take 1 tablet by mouth daily (with breakfast)      Multiple Vitamin (MULTI-VITAMIN PO) Take 1 tablet by mouth daily       simvastatin (ZOCOR) 20 MG tablet Take 1 tablet by mouth nightly       No current facility-administered medications for this visit.     Allergies   Allergen Reactions    Lipitor Other (See Comments)     WEAK MUSCLES    Oxycodone-Acetaminophen Hallucinations     Health Maintenance   Topic Date Due    Pneumococcal 65+ years Vaccine (1 - PCV) Never done    DTaP/Tdap/Td vaccine (1 - Tdap) Never done    Shingles vaccine (1 of 2) Never done    Respiratory Syncytial Virus (RSV) Pregnant or age 60 yrs+ (1 - 1-dose 60+ series) Never done    COVID-19 Vaccine (5 - 2023-24 season) 09/01/2023    Annual Wellness Visit (Medicare)  Never done    Depression Screen  09/26/2024    Lipids  09/27/2024

## 2024-03-26 ENCOUNTER — OFFICE VISIT (OUTPATIENT)
Dept: FAMILY MEDICINE CLINIC | Age: 74
End: 2024-03-26
Payer: MEDICARE

## 2024-03-26 VITALS
BODY MASS INDEX: 32.5 KG/M2 | SYSTOLIC BLOOD PRESSURE: 118 MMHG | WEIGHT: 172 LBS | RESPIRATION RATE: 12 BRPM | TEMPERATURE: 97.8 F | DIASTOLIC BLOOD PRESSURE: 72 MMHG | HEART RATE: 76 BPM

## 2024-03-26 DIAGNOSIS — I10 PRIMARY HYPERTENSION: Primary | ICD-10-CM

## 2024-03-26 DIAGNOSIS — R20.2 PARESTHESIA OF BILATERAL LEGS: ICD-10-CM

## 2024-03-26 DIAGNOSIS — E66.09 CLASS 1 OBESITY DUE TO EXCESS CALORIES WITH SERIOUS COMORBIDITY AND BODY MASS INDEX (BMI) OF 32.0 TO 32.9 IN ADULT: ICD-10-CM

## 2024-03-26 DIAGNOSIS — G24.5 BLEPHAROSPASM: ICD-10-CM

## 2024-03-26 DIAGNOSIS — Z23 NEED FOR PNEUMOCOCCAL VACCINATION: ICD-10-CM

## 2024-03-26 DIAGNOSIS — G51.4 FACIAL TWITCHING: ICD-10-CM

## 2024-03-26 DIAGNOSIS — E78.49 OTHER HYPERLIPIDEMIA: ICD-10-CM

## 2024-03-26 PROBLEM — I50.22 CHRONIC SYSTOLIC (CONGESTIVE) HEART FAILURE (HCC): Status: RESOLVED | Noted: 2024-01-22 | Resolved: 2024-03-26

## 2024-03-26 PROBLEM — I42.0 DILATED CARDIOMYOPATHY (HCC): Status: RESOLVED | Noted: 2023-09-27 | Resolved: 2024-03-26

## 2024-03-26 PROCEDURE — G8484 FLU IMMUNIZE NO ADMIN: HCPCS | Performed by: FAMILY MEDICINE

## 2024-03-26 PROCEDURE — 1036F TOBACCO NON-USER: CPT | Performed by: FAMILY MEDICINE

## 2024-03-26 PROCEDURE — 90677 PCV20 VACCINE IM: CPT | Performed by: FAMILY MEDICINE

## 2024-03-26 PROCEDURE — G8427 DOCREV CUR MEDS BY ELIG CLIN: HCPCS | Performed by: FAMILY MEDICINE

## 2024-03-26 PROCEDURE — 3078F DIAST BP <80 MM HG: CPT | Performed by: FAMILY MEDICINE

## 2024-03-26 PROCEDURE — G0009 ADMIN PNEUMOCOCCAL VACCINE: HCPCS | Performed by: FAMILY MEDICINE

## 2024-03-26 PROCEDURE — G8400 PT W/DXA NO RESULTS DOC: HCPCS | Performed by: FAMILY MEDICINE

## 2024-03-26 PROCEDURE — 1123F ACP DISCUSS/DSCN MKR DOCD: CPT | Performed by: FAMILY MEDICINE

## 2024-03-26 PROCEDURE — 1090F PRES/ABSN URINE INCON ASSESS: CPT | Performed by: FAMILY MEDICINE

## 2024-03-26 PROCEDURE — G8417 CALC BMI ABV UP PARAM F/U: HCPCS | Performed by: FAMILY MEDICINE

## 2024-03-26 PROCEDURE — 90473 IMMUNE ADMIN ORAL/NASAL: CPT | Performed by: FAMILY MEDICINE

## 2024-03-26 PROCEDURE — 3074F SYST BP LT 130 MM HG: CPT | Performed by: FAMILY MEDICINE

## 2024-03-26 PROCEDURE — 99214 OFFICE O/P EST MOD 30 MIN: CPT | Performed by: FAMILY MEDICINE

## 2024-03-26 ASSESSMENT — PATIENT HEALTH QUESTIONNAIRE - PHQ9
SUM OF ALL RESPONSES TO PHQ QUESTIONS 1-9: 0
2. FEELING DOWN, DEPRESSED OR HOPELESS: NOT AT ALL
SUM OF ALL RESPONSES TO PHQ QUESTIONS 1-9: 0
SUM OF ALL RESPONSES TO PHQ9 QUESTIONS 1 & 2: 0

## 2024-03-26 NOTE — PROGRESS NOTES
Pt referred to [facility]  [address] [place] [address]  Phone: [phone]  Fax: [phone]    I have faxed her referral information and the patient is to call and schedule herself. Pt aware of this and contact information given to pt.

## 2024-03-26 NOTE — PROGRESS NOTES
Immunization(s) given during visit:    Immunizations Administered       Name Date Dose Route    Pneumococcal, PCV20, PREVNAR 20, (age 6w+), IM, 0.5mL 3/26/2024 0.5 mL Intramuscular    Site: Deltoid- Left    Lot: [id number]    NDC: 4268-0074-58

## 2024-03-26 NOTE — PROGRESS NOTES
3/26/2024      Yoselin Garcia (:  1950) is a 74 y.o. female,Established patient, here for evaluation of the following chief complaint(s):  6 Month Follow-Up (Discuss possible referral for neuro ) and Tingling (Bilateral lower legs tingling at night )        ASSESSMENT/PLAN     1. Primary hypertension  -     Comprehensive Metabolic Panel; Future  -     Lipid Panel; Future  -     CBC with Auto Differential; Future  2. Other hyperlipidemia  -     Comprehensive Metabolic Panel; Future  -     Lipid Panel; Future  3. Class 1 obesity due to excess calories with serious comorbidity and body mass index (BMI) of 32.0 to 32.9 in adult  4. Blepharospasm  -     External Referral To Neurology  5. Facial twitching  -     External Referral To Neurology  6. Paresthesia of bilateral legs  -     External Referral To Neurology  7. Need for pneumococcal vaccination  -     Pneumococcal, PCV20, PREVNAR 20, (age 6w+), IM, PF    Continue current medications for hypertension and hyperlipidemia.  These conditions are stable and well-controlled.    Refer to neurology due to ongoing issues with blepharospasm and facial twitching along with some lower extremity paresthesias.  Patient prefers [facility] neurology.    Prevnar 20 today    Labs as above before next visit    Shingrix recommended       Return in about 6 months (around 2024) for AWV.    SUBJECTIVE     Yoselin Garcia is a 74 y.o.female      Here for follow up of chronic health problems including:    Patient Active Problem List   Diagnosis    Primary hypertension    Other hyperlipidemia    Class 1 obesity due to excess calories with serious comorbidity and body mass index (BMI) of 32.0 to 32.9 in adult     Healing reports some ongoing issues with blepharospasm and left facial twitching.  She has been seeing a local specialist who has been giving her Botox injections.  She would like to see a neurologist at this point due to the ongoing nature of this

## 2024-03-27 ASSESSMENT — ENCOUNTER SYMPTOMS
ABDOMINAL PAIN: 0
EYES NEGATIVE: 1
NAUSEA: 0
VOMITING: 0
SHORTNESS OF BREATH: 0
BLOOD IN STOOL: 0
DIARRHEA: 0

## 2024-04-02 ENCOUNTER — OFFICE VISIT (OUTPATIENT)
Dept: ENT CLINIC | Age: 74
End: 2024-04-02
Payer: MEDICARE

## 2024-04-02 VITALS
HEIGHT: 61 IN | TEMPERATURE: 97.4 F | OXYGEN SATURATION: 96 % | BODY MASS INDEX: 32.47 KG/M2 | DIASTOLIC BLOOD PRESSURE: 76 MMHG | RESPIRATION RATE: 16 BRPM | WEIGHT: 172 LBS | HEART RATE: 67 BPM | SYSTOLIC BLOOD PRESSURE: 124 MMHG

## 2024-04-02 DIAGNOSIS — M95.0 NASAL VALVE COLLAPSE: ICD-10-CM

## 2024-04-02 DIAGNOSIS — J34.89 CONCHA BULLOSA: ICD-10-CM

## 2024-04-02 DIAGNOSIS — J30.0 VASOMOTOR RHINITIS: Primary | ICD-10-CM

## 2024-04-02 DIAGNOSIS — R44.8 FACIAL PRESSURE: ICD-10-CM

## 2024-04-02 PROCEDURE — 1123F ACP DISCUSS/DSCN MKR DOCD: CPT | Performed by: PHYSICIAN ASSISTANT

## 2024-04-02 PROCEDURE — 1036F TOBACCO NON-USER: CPT | Performed by: PHYSICIAN ASSISTANT

## 2024-04-02 PROCEDURE — 3074F SYST BP LT 130 MM HG: CPT | Performed by: PHYSICIAN ASSISTANT

## 2024-04-02 PROCEDURE — G8417 CALC BMI ABV UP PARAM F/U: HCPCS | Performed by: PHYSICIAN ASSISTANT

## 2024-04-02 PROCEDURE — G8400 PT W/DXA NO RESULTS DOC: HCPCS | Performed by: PHYSICIAN ASSISTANT

## 2024-04-02 PROCEDURE — G8427 DOCREV CUR MEDS BY ELIG CLIN: HCPCS | Performed by: PHYSICIAN ASSISTANT

## 2024-04-02 PROCEDURE — 3078F DIAST BP <80 MM HG: CPT | Performed by: PHYSICIAN ASSISTANT

## 2024-04-02 PROCEDURE — 1090F PRES/ABSN URINE INCON ASSESS: CPT | Performed by: PHYSICIAN ASSISTANT

## 2024-04-02 PROCEDURE — 99213 OFFICE O/P EST LOW 20 MIN: CPT | Performed by: PHYSICIAN ASSISTANT

## 2024-04-02 NOTE — PROGRESS NOTES
[facility]  [facility]  [address]  [address]  [facility] [address]  Dept: [phone]  Dept Fax: [phone]  Loc: [phone]    Yoselin Garcia is a 74 y.o. female who was referred by No ref. provider found for:  Chief Complaint   Patient presents with    Follow-up     Patient is here for a 2-3 mo f/u. Patient states the medication Johan prescribed has really helped her with the dripping of her nose. She doesn't want to use it too much but said when she doesn't use it she is still dripping. She states she still has facial pressure as well but she thinks it may because of her eyes. No congestion.   .    HPI:   Current visit documentation 04/02/2024:    Yoselin Garcia presents today for follow-up regarding nasal drainage.  She reports that she has found the Atrovent nasal spray to be helpful.  She states she is only used it about once every 1-3 days as she is trying to avoid overuse, but has seem to positively impact the amount of rhinorrhea she has.  She does report some occasional pressure through the maxilla bilaterally.  She states that she breathes okay through her nose and denies any current/daily congestion.  She does report that occasionally her nose feels plugged with thick, tacky mucous.  She has tried the Witherbee pot intermittently in the past, but has not used it recently.    Previous visit documentation 1/22/24: Yoselin Garcia presents today for evaluation of nasal drainage. She reports thin, clear rhinorrhea from her nose more frequently associated with increased activity or eating (especially warm foods). She reports that her nose feels tight and like she \"can't breath all the way through my nose.\" She is frequently congested bilaterally. She tried claritin over the summer without benefit. She uses the Nettipot twice per day and it seems to help. She has tried Afrin intermittently in the past and it seems to help

## 2024-07-23 RX ORDER — SIMVASTATIN 20 MG
20 TABLET ORAL NIGHTLY
Qty: 90 TABLET | Refills: 3 | Status: SHIPPED | OUTPATIENT
Start: 2024-07-23

## 2024-07-23 NOTE — TELEPHONE ENCOUNTER
This medication refill is regarding a telephone request. Refill requested by patient.    Requested Prescriptions     Pending Prescriptions Disp Refills    simvastatin (ZOCOR) 20 MG tablet 90 tablet 3     Sig: Take 1 tablet by mouth nightly       Date of last visit: 3/26/2024   Date of next visit: 9/26/2024  Date of last refill: historical provider   Pharmacy Name: [facility]    Last Lipid Panel:    Lab Results   Component Value Date/Time    CHOL 169 09/27/2023 07:56 AM    TRIG 114 09/27/2023 07:56 AM    HDL 60 09/27/2023 07:56 AM     Last CMP:   Lab Results   Component Value Date     09/27/2023    K 4.2 09/27/2023     09/27/2023    CO2 27 09/27/2023    BUN 7 09/27/2023    CREATININE 0.7 09/27/2023    GLUCOSE 103 09/27/2023    CALCIUM 9.1 09/27/2023    BILITOT 0.5 09/27/2023    ALKPHOS 44 09/27/2023    AST 19 09/27/2023    ALT 15 09/27/2023    LABGLOM >60 09/27/2023     Rx verified, ordered and set to EP.     WCP

## 2024-09-13 ENCOUNTER — HOSPITAL ENCOUNTER (OUTPATIENT)
Age: 74
Discharge: HOME | End: 2024-09-13
Payer: MEDICARE

## 2024-09-13 DIAGNOSIS — I10 PRIMARY HYPERTENSION: ICD-10-CM

## 2024-09-13 DIAGNOSIS — E78.49 OTHER HYPERLIPIDEMIA: ICD-10-CM

## 2024-09-13 LAB
ALBUMIN SERPL BCG-MCNC: 4 G/DL (ref 3.5–5.1)
ALP SERPL-CCNC: 49 U/L (ref 38–126)
ALT SERPL W/O P-5'-P-CCNC: 14 U/L (ref 11–66)
ANION GAP SERPL CALC-SCNC: 9 MEQ/L (ref 8–16)
AST SERPL-CCNC: 22 U/L (ref 5–40)
BASOPHILS ABSOLUTE: 0 THOU/MM3 (ref 0–0.1)
BASOPHILS NFR BLD AUTO: 0.7 %
BILIRUB SERPL-MCNC: 0.6 MG/DL (ref 0.3–1.2)
BUN SERPL-MCNC: 8 MG/DL (ref 7–22)
CALCIUM SERPL-MCNC: 9.2 MG/DL (ref 8.5–10.5)
CHLORIDE SERPL-SCNC: 103 MEQ/L (ref 98–111)
CHOLEST SERPL-MCNC: 184 MG/DL (ref 100–199)
CO2 SERPL-SCNC: 27 MEQ/L (ref 23–33)
CREAT SERPL-MCNC: 0.7 MG/DL (ref 0.4–1.2)
DEPRECATED RDW RBC AUTO: 46.9 FL (ref 35–45)
EOSINOPHIL NFR BLD AUTO: 1.3 %
EOSINOPHILS ABSOLUTE: 0.1 THOU/MM3 (ref 0–0.4)
ERYTHROCYTE [DISTWIDTH] IN BLOOD BY AUTOMATED COUNT: 13.2 % (ref 11.5–14.5)
GFR SERPL CREATININE-BSD FRML MDRD: 89 ML/MIN/1.73M2
GLUCOSE SERPL-MCNC: 99 MG/DL (ref 70–108)
HCT VFR BLD AUTO: 43.3 % (ref 37–47)
HDLC SERPL-MCNC: 57 MG/DL
HGB BLD-MCNC: 14 GM/DL (ref 12–16)
IMM GRANULOCYTES # BLD AUTO: 0.02 THOU/MM3 (ref 0–0.07)
IMM GRANULOCYTES NFR BLD AUTO: 0.3 %
LDLC SERPL CALC-MCNC: 100 MG/DL
LYMPHOCYTES ABSOLUTE: 1.9 THOU/MM3 (ref 1–4.8)
LYMPHOCYTES NFR BLD AUTO: 30.6 %
MCH RBC QN AUTO: 31 PG (ref 26–33)
MCHC RBC AUTO-ENTMCNC: 32.3 GM/DL (ref 32.2–35.5)
MCV RBC AUTO: 96 FL (ref 81–99)
MONOCYTES ABSOLUTE: 0.5 THOU/MM3 (ref 0.4–1.3)
MONOCYTES NFR BLD AUTO: 7.7 %
NEUTROPHILS ABSOLUTE: 3.6 THOU/MM3 (ref 1.8–7.7)
NEUTROPHILS NFR BLD AUTO: 59.4 %
NRBC BLD AUTO-RTO: 0 /100 WBC
PLATELET # BLD AUTO: 211 THOU/MM3 (ref 130–400)
PMV BLD AUTO: 12 FL (ref 9.4–12.4)
POTASSIUM SERPL-SCNC: 4.4 MEQ/L (ref 3.5–5.2)
PROT SERPL-MCNC: 6.3 G/DL (ref 6.1–8)
RBC # BLD AUTO: 4.51 MILL/MM3 (ref 4.2–5.4)
SODIUM SERPL-SCNC: 139 MEQ/L (ref 135–145)
TRIGL SERPL-MCNC: 134 MG/DL (ref 0–199)
WBC # BLD AUTO: 6.1 THOU/MM3 (ref 4.8–10.8)

## 2024-09-13 PROCEDURE — 80053 COMPREHEN METABOLIC PANEL: CPT

## 2024-09-13 PROCEDURE — 36415 COLL VENOUS BLD VENIPUNCTURE: CPT

## 2024-09-13 PROCEDURE — 85025 COMPLETE CBC W/AUTO DIFF WBC: CPT

## 2024-09-13 PROCEDURE — 80061 LIPID PANEL: CPT

## 2024-09-27 SDOH — HEALTH STABILITY: PHYSICAL HEALTH: ON AVERAGE, HOW MANY DAYS PER WEEK DO YOU ENGAGE IN MODERATE TO STRENUOUS EXERCISE (LIKE A BRISK WALK)?: 0 DAYS

## 2024-09-27 SDOH — HEALTH STABILITY: PHYSICAL HEALTH: ON AVERAGE, HOW MANY MINUTES DO YOU ENGAGE IN EXERCISE AT THIS LEVEL?: 0 MIN

## 2024-09-27 ASSESSMENT — PATIENT HEALTH QUESTIONNAIRE - PHQ9
SUM OF ALL RESPONSES TO PHQ QUESTIONS 1-9: 0
SUM OF ALL RESPONSES TO PHQ QUESTIONS 1-9: 0
1. LITTLE INTEREST OR PLEASURE IN DOING THINGS: NOT AT ALL
SUM OF ALL RESPONSES TO PHQ QUESTIONS 1-9: 0
2. FEELING DOWN, DEPRESSED OR HOPELESS: NOT AT ALL
SUM OF ALL RESPONSES TO PHQ9 QUESTIONS 1 & 2: 0
SUM OF ALL RESPONSES TO PHQ QUESTIONS 1-9: 0

## 2024-09-27 ASSESSMENT — LIFESTYLE VARIABLES
HOW OFTEN DO YOU HAVE SIX OR MORE DRINKS ON ONE OCCASION: 1
HOW OFTEN DO YOU HAVE A DRINK CONTAINING ALCOHOL: 2
HOW MANY STANDARD DRINKS CONTAINING ALCOHOL DO YOU HAVE ON A TYPICAL DAY: 1
HOW OFTEN DO YOU HAVE A DRINK CONTAINING ALCOHOL: MONTHLY OR LESS
HOW MANY STANDARD DRINKS CONTAINING ALCOHOL DO YOU HAVE ON A TYPICAL DAY: 1 OR 2

## 2024-09-28 SDOH — ECONOMIC STABILITY: TRANSPORTATION INSECURITY
IN THE PAST 12 MONTHS, HAS LACK OF TRANSPORTATION KEPT YOU FROM MEETINGS, WORK, OR FROM GETTING THINGS NEEDED FOR DAILY LIVING?: NO

## 2024-09-28 SDOH — ECONOMIC STABILITY: FOOD INSECURITY: WITHIN THE PAST 12 MONTHS, THE FOOD YOU BOUGHT JUST DIDN'T LAST AND YOU DIDN'T HAVE MONEY TO GET MORE.: NEVER TRUE

## 2024-09-28 SDOH — ECONOMIC STABILITY: INCOME INSECURITY: HOW HARD IS IT FOR YOU TO PAY FOR THE VERY BASICS LIKE FOOD, HOUSING, MEDICAL CARE, AND HEATING?: NOT HARD AT ALL

## 2024-09-28 SDOH — ECONOMIC STABILITY: FOOD INSECURITY: WITHIN THE PAST 12 MONTHS, YOU WORRIED THAT YOUR FOOD WOULD RUN OUT BEFORE YOU GOT MONEY TO BUY MORE.: NEVER TRUE

## 2024-10-01 ENCOUNTER — OFFICE VISIT (OUTPATIENT)
Dept: FAMILY MEDICINE CLINIC | Age: 74
End: 2024-10-01
Payer: MEDICARE

## 2024-10-01 VITALS
RESPIRATION RATE: 16 BRPM | DIASTOLIC BLOOD PRESSURE: 84 MMHG | BODY MASS INDEX: 32.28 KG/M2 | SYSTOLIC BLOOD PRESSURE: 130 MMHG | HEART RATE: 72 BPM | TEMPERATURE: 98 F | WEIGHT: 171 LBS | HEIGHT: 61 IN

## 2024-10-01 DIAGNOSIS — Z00.00 INITIAL MEDICARE ANNUAL WELLNESS VISIT: Primary | ICD-10-CM

## 2024-10-01 DIAGNOSIS — Z23 NEED FOR SHINGLES VACCINE: ICD-10-CM

## 2024-10-01 DIAGNOSIS — Z23 NEED FOR INFLUENZA VACCINATION: ICD-10-CM

## 2024-10-01 DIAGNOSIS — Z12.31 ENCOUNTER FOR SCREENING MAMMOGRAM FOR MALIGNANT NEOPLASM OF BREAST: ICD-10-CM

## 2024-10-01 PROCEDURE — G8482 FLU IMMUNIZE ORDER/ADMIN: HCPCS | Performed by: FAMILY MEDICINE

## 2024-10-01 PROCEDURE — 3079F DIAST BP 80-89 MM HG: CPT | Performed by: FAMILY MEDICINE

## 2024-10-01 PROCEDURE — 90653 IIV ADJUVANT VACCINE IM: CPT | Performed by: FAMILY MEDICINE

## 2024-10-01 PROCEDURE — G0008 ADMIN INFLUENZA VIRUS VAC: HCPCS | Performed by: FAMILY MEDICINE

## 2024-10-01 PROCEDURE — 3075F SYST BP GE 130 - 139MM HG: CPT | Performed by: FAMILY MEDICINE

## 2024-10-01 PROCEDURE — 1123F ACP DISCUSS/DSCN MKR DOCD: CPT | Performed by: FAMILY MEDICINE

## 2024-10-01 PROCEDURE — 90750 HZV VACC RECOMBINANT IM: CPT | Performed by: FAMILY MEDICINE

## 2024-10-01 PROCEDURE — G0438 PPPS, INITIAL VISIT: HCPCS | Performed by: FAMILY MEDICINE

## 2024-10-01 PROCEDURE — 90471 IMMUNIZATION ADMIN: CPT | Performed by: FAMILY MEDICINE

## 2024-10-01 NOTE — PROGRESS NOTES
Vaccine Information Sheet, \"Influenza - Inactivated\"  given to Yoselin Garcia, or parent/legal guardian of  Yoselin Garcia and verbalized understanding.    Patient responses:    Have you ever had a reaction to a flu vaccine? No  Do you have an allergy to eggs, Latex -induced anaphylaxis, neomycin or polymixin?  No  Do you have an allergy to Thimerosal, contact lens solution, or Merthiolate? No  Have you ever had Guillian Ripon Syndrome?  No  Do you have any current illness?  No  Do you have a temperature above 100.4 degrees? No  Are you pregnant? No  If pregnant, permission obtained from physician? NA  Do you have an active neurological disorder? No  Flu vaccine given per order. Please see immunization tab.  After obtaining consent, and per orders of Dr. Nugent, injection of Fluad 0.5 ml IM right deltoid and Shingrix 0.5 ml IM left deltoid given by ROSA M TANNER RN. Patient tolerated and left after injections. ABN signed.

## 2024-10-01 NOTE — PROGRESS NOTES
[facility]  [facility]  [address]  [facility] [address]  Dept: [phone]  Loc: [phone]    10/1/2024    Chief Complaint   Patient presents with    Medicare AWV     Here for AWV and check up for chronic conditions. Labs done, results in Epic. Requesting flu shot.         Medicare Annual Wellness Visit    Yoselin Garcia is here for Medicare AWV (Here for AWV and check up for chronic conditions. Labs done, results in Epic. Requesting flu shot.)    Assessment & Plan     1. Initial Medicare annual wellness visit  2. Encounter for screening mammogram for malignant neoplasm of breast  -     ISABEL MIKEY DIGITAL SCREEN BILATERAL; Future  3. Need for influenza vaccination  -     Influenza, FLUAD Trivalent, (age 65 y+), IM, Preservative Free, 0.5mL  4. Need for shingles vaccine  -     Zoster, SHINGRIX, (50 yrs +), IM    Continue current medications for HTN and hyperlipidemia.  These chronic conditions are stable and well controlled    High dose flu shot and Shingrix #1 today    Shingrix #2 in 2-6 months    Patient will follow up with her specialists as planned    Mammogram for breast cancer screening in late December    Recommendations for Preventive Services Due: see orders and patient instructions/AVS.    Recommended screening schedule for the next 5-10 years is provided to the patient in written form: see Patient Instructions/AVS.     Return in about 6 months (around 4/1/2025) for Follow up.     Subjective     Patient is generally well.  She continues to work with a specialist on her chronic blepharospasm and is getting botox injections.  BPs have been stable.  She admits that she's not getting much exercise.  Takes all meds as directed and denies side effects. No recent illnesses or hospitalizations.  Denies CP, SOB.  Requests flu and shingles vaccines.  Nonsmoker.  Body mass index is 32.31 kg/m².      Patient's complete Health Risk Assessment and screening

## 2024-10-01 NOTE — PATIENT INSTRUCTIONS
Learning About Being Active as an Older Adult  Why is being active important as you get older?     Being active is one of the best things you can do for your health. And it's never too late to start. Being active--or getting active, if you aren't already--has definite benefits. It can:  Give you more energy,  Keep your mind sharp.  Improve balance to reduce your risk of falls.  Help you manage chronic illness with fewer medicines.  No matter how old you are, how fit you are, or what health problems you have, there is a form of activity that will work for you. And the more physical activity you can do, the better your overall health will be.  What kinds of activity can help you stay healthy?  Being more active will make your daily activities easier. Physical activity includes planned exercise and things you do in daily life. There are four types of activity:  Aerobic.  Doing aerobic activity makes your heart and lungs strong.  Includes walking, dancing, and gardening.  Aim for at least 2½ hours spread throughout the week.  It improves your energy and can help you sleep better.  Muscle-strengthening.  This type of activity can help maintain muscle and strengthen bones.  Includes climbing stairs, using resistance bands, and lifting or carrying heavy loads.  Aim for at least twice a week.  It can help protect the knees and other joints.  Stretching.  Stretching gives you better range of motion in joints and muscles.  Includes upper arm stretches, calf stretches, and gentle yoga.  Aim for at least twice a week, preferably after your muscles are warmed up from other activities.  It can help you function better in daily life.  Balancing.  This helps you stay coordinated and have good posture.  Includes heel-to-toe walking, j luis chi, and certain types of yoga.  Aim for at least 3 days a week.  It can reduce your risk of falling.  Even if you have a hard time meeting the recommendations, it's better to be more active

## 2024-12-26 ENCOUNTER — HOSPITAL ENCOUNTER (OUTPATIENT)
Dept: MAMMOGRAPHY | Age: 74
Discharge: HOME | End: 2024-12-26
Attending: FAMILY MEDICINE
Payer: MEDICARE

## 2024-12-26 DIAGNOSIS — Z12.31 ENCOUNTER FOR SCREENING MAMMOGRAM FOR MALIGNANT NEOPLASM OF BREAST: ICD-10-CM

## 2024-12-26 PROCEDURE — 77063 BREAST TOMOSYNTHESIS BI: CPT

## 2025-02-28 ENCOUNTER — OFFICE VISIT (OUTPATIENT)
Dept: CARDIOLOGY CLINIC | Age: 75
End: 2025-02-28

## 2025-02-28 VITALS
HEART RATE: 73 BPM | SYSTOLIC BLOOD PRESSURE: 128 MMHG | HEIGHT: 61 IN | DIASTOLIC BLOOD PRESSURE: 78 MMHG | BODY MASS INDEX: 31.72 KG/M2 | WEIGHT: 168 LBS

## 2025-02-28 DIAGNOSIS — I35.1 NONRHEUMATIC AORTIC VALVE INSUFFICIENCY: Primary | ICD-10-CM

## 2025-02-28 DIAGNOSIS — R06.09 DYSPNEA ON EXERTION: ICD-10-CM

## 2025-02-28 DIAGNOSIS — I10 PRIMARY HYPERTENSION: ICD-10-CM

## 2025-02-28 DIAGNOSIS — I25.10 CORONARY ARTERY DISEASE INVOLVING NATIVE CORONARY ARTERY OF NATIVE HEART WITHOUT ANGINA PECTORIS: ICD-10-CM

## 2025-02-28 NOTE — PROGRESS NOTES
[facility] [place] [facility]  [facility] CARDIOLOGY  [address]  [address]  [facility] [address]  Dept: [phone]  Dept Fax: [phone]  Loc: [phone]    Visit Date: 2/28/2025    Yoselin Garcia is a 74 y.o. female who presents todayfor:  Chief Complaint   Patient presents with    Follow-up     1 year follow up.    Hypertension    Coronary Artery Disease    Hyperlipidemia     Higher BP at home  Better today   No chest pain   Some more changes in breathing  Fatigue and dyspnea   Cath before  in 2021 mild CAD  No dizziness  No syncope  On statins for hyperlipidemia  No issues with the meds     HPI:  HPI  Past Medical History:   Diagnosis Date    Cancer (HCC)     BCC, SCC    Hyperlipidemia     Hypertension     Leaky heart valve     Murmur     Prolonged emergence from general anesthesia     Sepsis (HCC) 2008      Past Surgical History:   Procedure Laterality Date    APPENDECTOMY  1989    ARM SURGERY Right 11/23/2022    EXCISION SCC MODERATELY-DIFFERENTIATED RIGHT PROXIMAL FOREARM, AND SHAVE BIOPSY RIGHT RADIAL DISTAL FOREARM performed by Saran Jiménez MD at [facility] OR    BLADDER SURGERY  01/2021    for prolapsed bladder    BLEPHAROPLASTY Left 2024    [facility]    CATARACT REMOVAL Bilateral     CHEST WALL RESECTION Right 02/10/2020    EXCISION SCC RIGHT MID CHEST performed by Saran Jiménez MD at [facility] OR    COLONOSCOPY      CYST REMOVAL  09/19/2023    Left face--Dr. Juarez    HYSTERECTOMY (CERVIX STATUS UNKNOWN)  1989    KNEE ARTHROSCOPY Right 2020    meniscus tear    OVARY REMOVAL Bilateral 1988    RECTOCELE REPAIR  01/21/2022    Robtic Sacrocolpopexy ([facility])    SKIN BIOPSY       Family History   Problem Relation Age of Onset    Depression Mother     Heart Disease Mother     Dementia Sister 70 - 79    Dementia Sister 75    Pancreatic Cancer Sister 70 - 79    Breast Cancer Sister 60 - 69    High Blood Pressure Brother     Heart Disease

## 2025-02-28 NOTE — PROGRESS NOTES
1 year follow up.    EKG done today.    Reports hypertension, fatigue, shortness of breath, and edema in her lower extremities.    Patient brought BP log.    Denies chest pain and dizziness.

## 2025-03-17 ENCOUNTER — TELEPHONE (OUTPATIENT)
Dept: FAMILY MEDICINE CLINIC | Age: 75
End: 2025-03-17

## 2025-03-17 ENCOUNTER — OFFICE VISIT (OUTPATIENT)
Dept: FAMILY MEDICINE CLINIC | Age: 75
End: 2025-03-17
Payer: MEDICARE

## 2025-03-17 VITALS
SYSTOLIC BLOOD PRESSURE: 132 MMHG | BODY MASS INDEX: 31.93 KG/M2 | HEART RATE: 76 BPM | WEIGHT: 169 LBS | DIASTOLIC BLOOD PRESSURE: 68 MMHG

## 2025-03-17 DIAGNOSIS — B96.89 ACUTE BACTERIAL SINUSITIS: ICD-10-CM

## 2025-03-17 DIAGNOSIS — I10 PRIMARY HYPERTENSION: ICD-10-CM

## 2025-03-17 DIAGNOSIS — J01.90 ACUTE BACTERIAL SINUSITIS: ICD-10-CM

## 2025-03-17 DIAGNOSIS — R05.1 ACUTE COUGH: ICD-10-CM

## 2025-03-17 DIAGNOSIS — U07.1 COVID: ICD-10-CM

## 2025-03-17 DIAGNOSIS — H65.93 OME (OTITIS MEDIA WITH EFFUSION), BILATERAL: Primary | ICD-10-CM

## 2025-03-17 PROCEDURE — G8417 CALC BMI ABV UP PARAM F/U: HCPCS | Performed by: NURSE PRACTITIONER

## 2025-03-17 PROCEDURE — 99214 OFFICE O/P EST MOD 30 MIN: CPT | Performed by: NURSE PRACTITIONER

## 2025-03-17 PROCEDURE — 1123F ACP DISCUSS/DSCN MKR DOCD: CPT | Performed by: NURSE PRACTITIONER

## 2025-03-17 PROCEDURE — 1090F PRES/ABSN URINE INCON ASSESS: CPT | Performed by: NURSE PRACTITIONER

## 2025-03-17 PROCEDURE — 3078F DIAST BP <80 MM HG: CPT | Performed by: NURSE PRACTITIONER

## 2025-03-17 PROCEDURE — G8400 PT W/DXA NO RESULTS DOC: HCPCS | Performed by: NURSE PRACTITIONER

## 2025-03-17 PROCEDURE — 1160F RVW MEDS BY RX/DR IN RCRD: CPT | Performed by: NURSE PRACTITIONER

## 2025-03-17 PROCEDURE — 1036F TOBACCO NON-USER: CPT | Performed by: NURSE PRACTITIONER

## 2025-03-17 PROCEDURE — 3075F SYST BP GE 130 - 139MM HG: CPT | Performed by: NURSE PRACTITIONER

## 2025-03-17 PROCEDURE — 1159F MED LIST DOCD IN RCRD: CPT | Performed by: NURSE PRACTITIONER

## 2025-03-17 PROCEDURE — G8427 DOCREV CUR MEDS BY ELIG CLIN: HCPCS | Performed by: NURSE PRACTITIONER

## 2025-03-17 RX ORDER — CEFDINIR 300 MG/1
300 CAPSULE ORAL 2 TIMES DAILY
Qty: 20 CAPSULE | Refills: 0 | Status: SHIPPED | OUTPATIENT
Start: 2025-03-17 | End: 2025-03-27

## 2025-03-17 RX ORDER — DEXTROMETHORPHAN HYDROBROMIDE AND PROMETHAZINE HYDROCHLORIDE 15; 6.25 MG/5ML; MG/5ML
5 SYRUP ORAL 4 TIMES DAILY PRN
Qty: 118 ML | Refills: 0 | Status: SHIPPED | OUTPATIENT
Start: 2025-03-17 | End: 2025-03-24

## 2025-03-17 RX ORDER — NIRMATRELVIR AND RITONAVIR 300-100 MG
3 KIT ORAL EVERY 12 HOURS
COMMUNITY
Start: 2025-03-12 | End: 2025-03-18 | Stop reason: ALTCHOICE

## 2025-03-17 NOTE — PROGRESS NOTES
Coordination: Coordination normal.   Psychiatric:         Mood and Affect: Mood normal.         Behavior: Behavior normal.         Thought Content: Thought content normal.         Judgment: Judgment normal.         Assessment/Plan:      Yoselin \"Yolanda\" was seen today for post-covid symptoms.    Diagnoses and all orders for this visit:    OME (otitis media with effusion), bilateral  -     cefdinir (OMNICEF) 300 MG capsule; Take 1 capsule by mouth 2 times daily for 10 days    Acute bacterial sinusitis  -     cefdinir (OMNICEF) 300 MG capsule; Take 1 capsule by mouth 2 times daily for 10 days    COVID    Acute cough  -     XR CHEST (2 VW); Future  -     promethazine-dextromethorphan (PROMETHAZINE-DM) 6.25-15 MG/5ML syrup; Take 5 mLs by mouth 4 times daily as needed for Cough    Primary hypertension  - Chronic, stable.  Continue current TX.     - Rest and increase fluids  - Tylenol as needed for pain and fever  - Good handwashing and clean all surfaces touched  - Call office with any questions or concerns, or if symptoms are getting worse or changing  - ER for any chest pain or SOB  - Have chest x-ray completed if not improving.      Return if symptoms worsen or fail to improve.    Patient given educational materials - see patient instructions.  Discussed use, benefit, and side effects of prescribed medications.  All patient questions answered.  Pt voiced understanding.        Electronically signed by VASU ARCEO CNP on 3/18/2025 at 7:53 AM

## 2025-03-17 NOTE — TELEPHONE ENCOUNTER
Pt in to see PCP office and is positive for covid. She is scheduled for a stress test this week and is wondering if it needs to be rescheduled ? Can you please reach out to pt

## 2025-03-18 ASSESSMENT — ENCOUNTER SYMPTOMS
SHORTNESS OF BREATH: 0
SINUS PRESSURE: 1
SORE THROAT: 0
RHINORRHEA: 1
WHEEZING: 0
SWOLLEN GLANDS: 0
HEARTBURN: 0
HOARSE VOICE: 0
HEMOPTYSIS: 0
COUGH: 1

## 2025-03-27 ENCOUNTER — HOSPITAL ENCOUNTER (OUTPATIENT)
Age: 75
Discharge: HOME | End: 2025-03-27

## 2025-03-27 ENCOUNTER — HOSPITAL ENCOUNTER (EMERGENCY)
Age: 75
Discharge: HOME | End: 2025-03-27
Payer: MEDICARE

## 2025-03-27 ENCOUNTER — HOSPITAL ENCOUNTER (EMERGENCY)
Dept: GENERAL RADIOLOGY | Age: 75
Discharge: HOME | End: 2025-03-27
Payer: MEDICARE

## 2025-03-27 VITALS
TEMPERATURE: 97 F | DIASTOLIC BLOOD PRESSURE: 69 MMHG | HEART RATE: 84 BPM | OXYGEN SATURATION: 97 % | RESPIRATION RATE: 18 BRPM | SYSTOLIC BLOOD PRESSURE: 144 MMHG

## 2025-03-27 DIAGNOSIS — J32.9 SINOBRONCHITIS: Primary | ICD-10-CM

## 2025-03-27 DIAGNOSIS — J40 SINOBRONCHITIS: Primary | ICD-10-CM

## 2025-03-27 DIAGNOSIS — R05.1 ACUTE COUGH: ICD-10-CM

## 2025-03-27 PROCEDURE — 99213 OFFICE O/P EST LOW 20 MIN: CPT

## 2025-03-27 PROCEDURE — 99214 OFFICE O/P EST MOD 30 MIN: CPT

## 2025-03-27 PROCEDURE — 71046 X-RAY EXAM CHEST 2 VIEWS: CPT

## 2025-03-27 RX ORDER — LORATADINE 10 MG/1
10 TABLET ORAL DAILY
Qty: 15 TABLET | Refills: 0 | Status: SHIPPED | OUTPATIENT
Start: 2025-03-27 | End: 2025-04-11

## 2025-03-27 RX ORDER — PREDNISONE 20 MG/1
20 TABLET ORAL 2 TIMES DAILY
Qty: 10 TABLET | Refills: 0 | Status: SHIPPED | OUTPATIENT
Start: 2025-03-27 | End: 2025-04-01 | Stop reason: ALTCHOICE

## 2025-03-27 RX ORDER — FLUCONAZOLE 150 MG/1
150 TABLET ORAL
Qty: 2 TABLET | Refills: 0 | Status: SHIPPED | OUTPATIENT
Start: 2025-03-27 | End: 2025-04-01 | Stop reason: ALTCHOICE

## 2025-03-27 RX ORDER — DOXYCYCLINE HYCLATE 100 MG
100 TABLET ORAL 2 TIMES DAILY
Qty: 14 TABLET | Refills: 0 | Status: SHIPPED | OUTPATIENT
Start: 2025-03-27 | End: 2025-04-03

## 2025-03-27 ASSESSMENT — PAIN SCALES - GENERAL: PAINLEVEL_OUTOF10: 3

## 2025-03-27 ASSESSMENT — PAIN - FUNCTIONAL ASSESSMENT
PAIN_FUNCTIONAL_ASSESSMENT: ACTIVITIES ARE NOT PREVENTED
PAIN_FUNCTIONAL_ASSESSMENT: 0-10
PAIN_FUNCTIONAL_ASSESSMENT_SITE2: ACTIVITIES ARE NOT PREVENTED

## 2025-03-27 ASSESSMENT — PAIN DESCRIPTION - INTENSITY: RATING_2: 3

## 2025-03-27 ASSESSMENT — PAIN DESCRIPTION - LOCATION
LOCATION: EAR
LOCATION_2: BACK

## 2025-03-27 ASSESSMENT — ENCOUNTER SYMPTOMS
COUGH: 1
SINUS PAIN: 1
SINUS PRESSURE: 1

## 2025-03-27 ASSESSMENT — PAIN DESCRIPTION - PAIN TYPE: TYPE: ACUTE PAIN

## 2025-03-27 ASSESSMENT — PAIN DESCRIPTION - DESCRIPTORS
DESCRIPTORS: ITCHING
DESCRIPTORS_2: ACHING

## 2025-03-27 ASSESSMENT — PAIN DESCRIPTION - ONSET: ONSET: ON-GOING

## 2025-03-27 ASSESSMENT — PAIN DESCRIPTION - FREQUENCY: FREQUENCY: INTERMITTENT

## 2025-03-27 NOTE — ED PROVIDER NOTES
[facility] EMERGENCY DEPARTMENT  Urgent Care Encounter       CHIEF COMPLAINT       Chief Complaint   Patient presents with    Cough    Ear Pain    Fatigue     Tested positive for covid 03/12/24 while in [place] on a bus trip,  and also saw Dr. Nugent - when she came home and was dx with ear infection       Nurses Notes reviewed and I agree except as noted in the HPI.  HISTORY OF PRESENT ILLNESS   Yoselin Garcia is a 75 y.o. female who presents with complaints of cough, ear pain, and fatigue. Pt reports that she was seen in PCP office 3/17 and given omnicef 2x a day for 10 days which she finished today. Pt reports testing positive for covid the week of 3/9. Pt reports that she feels as though she is not better. Pt was educated to get chest x-ray completed in which she did at this time today instead of 10 days ago. Pt reports pain 3/10 at this time.     The history is provided by the patient.       REVIEW OF SYSTEMS     Review of Systems   Constitutional:  Positive for fatigue.   HENT:  Positive for congestion, ear pain, sinus pressure and sinus pain.    Respiratory:  Positive for cough.    Musculoskeletal:  Positive for myalgias.   All other systems reviewed and are negative.      PAST MEDICAL HISTORY         Diagnosis Date    Cancer (HCC)     BCC, SCC    Hyperlipidemia     Hypertension     Leaky heart valve     Murmur     Prolonged emergence from general anesthesia     Sepsis (HCC) 2008       SURGICALHISTORY     Patient  has a past surgical history that includes Chest Wall Resection (Right, 02/10/2020); Appendectomy (1989); Colonoscopy; skin biopsy; Hysterectomy (1989); Ovary removal (Bilateral, 1988); Cataract removal (Bilateral); Knee arthroscopy (Right, 2020); Bladder surgery (01/2021); Arm Surgery (Right, 11/23/2022); Rectocele repair (01/21/2022); cyst removal (09/19/2023); and Blepharoplasty (Left, 2024).    CURRENT MEDICATIONS       Discharge Medication List as of 3/27/2025        Cervical: No cervical adenopathy.   Neurological:      Mental Status: She is alert.         DIAGNOSTIC RESULTS     Labs:No results found for this visit on 03/27/25.    IMAGING:    No orders to display         EKG:      URGENT CARE COURSE:     Vitals:    03/27/25 1321   BP: (!) 144/69   Pulse: 84   Resp: 18   Temp: 97 °F (36.1 °C)   TempSrc: Temporal   SpO2: 97%       Medications - No data to display         PROCEDURES:  None    FINAL IMPRESSION      1. Sinobronchitis          DISPOSITION/ PLAN     Chest x-ray image reviewed by this clinician, did not appreciate any infiltrates or atelectasis. Last EKG from 2022 also reviewed for comparison.  Patient diagnosed with sinobronchitis. Pt educated to use tylenol, Claritin, prednisone, and doxycycline. Pt to follow up with PCP next week. Er if symptoms worsen.       PATIENT REFERRED TO:  Laxmi Nugent MD  [address] / [place] [address]      DISCHARGE MEDICATIONS:  Discharge Medication List as of 3/27/2025  1:52 PM        START taking these medications    Details   predniSONE (DELTASONE) 20 MG tablet Take 1 tablet by mouth 2 times daily for 5 days, Disp-10 tablet, R-0Normal      doxycycline hyclate (VIBRA-TABS) 100 MG tablet Take 1 tablet by mouth 2 times daily for 7 days, Disp-14 tablet, R-0Normal      loratadine (CLARITIN) 10 MG tablet Take 1 tablet by mouth daily for 15 days, Disp-15 tablet, R-0Normal             Discharge Medication List as of 3/27/2025  1:52 PM          Discharge Medication List as of 3/27/2025  1:52 PM          VASU Sanders CNP    (Please note that portions of this note were completed with a voice recognition program. Efforts were made to edit the dictations but occasionally words are mis-transcribed.)            Guerda Garcia APRN - CNP  03/27/25 [id number]       Guerda Garcia APRN - CNP  03/27/25 [id number]

## 2025-03-27 NOTE — DISCHARGE INSTRUCTIONS
Claritin daily  Prednisone 2x a day for 5 days  Antibiotics 2x a day for 7 days  Follow up with PCP  Tylenol for pain

## 2025-03-28 ENCOUNTER — RESULTS FOLLOW-UP (OUTPATIENT)
Dept: FAMILY MEDICINE CLINIC | Age: 75
End: 2025-03-28

## 2025-03-28 NOTE — TELEPHONE ENCOUNTER
Notified patient. Has an appointment on 4/1/2025        Result Note  Please let pt know that her chest x-ray was normal.  It looks like she was seen in the UC yesterday and started on Doxycycline. Please have pt finish this and follow up in office if not improving.  Thanks -WS  XR CHEST (2 VW)

## 2025-04-01 ENCOUNTER — OFFICE VISIT (OUTPATIENT)
Dept: FAMILY MEDICINE CLINIC | Age: 75
End: 2025-04-01
Payer: MEDICARE

## 2025-04-01 VITALS
SYSTOLIC BLOOD PRESSURE: 166 MMHG | DIASTOLIC BLOOD PRESSURE: 82 MMHG | BODY MASS INDEX: 30.99 KG/M2 | RESPIRATION RATE: 16 BRPM | TEMPERATURE: 98.3 F | WEIGHT: 164 LBS | HEART RATE: 72 BPM

## 2025-04-01 DIAGNOSIS — I10 PRIMARY HYPERTENSION: Primary | ICD-10-CM

## 2025-04-01 DIAGNOSIS — E78.49 OTHER HYPERLIPIDEMIA: ICD-10-CM

## 2025-04-01 DIAGNOSIS — E66.09 CLASS 1 OBESITY DUE TO EXCESS CALORIES WITH SERIOUS COMORBIDITY AND BODY MASS INDEX (BMI) OF 30.0 TO 30.9 IN ADULT: ICD-10-CM

## 2025-04-01 DIAGNOSIS — E66.811 CLASS 1 OBESITY DUE TO EXCESS CALORIES WITH SERIOUS COMORBIDITY AND BODY MASS INDEX (BMI) OF 30.0 TO 30.9 IN ADULT: ICD-10-CM

## 2025-04-01 DIAGNOSIS — Z23 NEED FOR SHINGLES VACCINE: ICD-10-CM

## 2025-04-01 PROCEDURE — 3077F SYST BP >= 140 MM HG: CPT | Performed by: FAMILY MEDICINE

## 2025-04-01 PROCEDURE — 90471 IMMUNIZATION ADMIN: CPT | Performed by: FAMILY MEDICINE

## 2025-04-01 PROCEDURE — 3079F DIAST BP 80-89 MM HG: CPT | Performed by: FAMILY MEDICINE

## 2025-04-01 PROCEDURE — 90750 HZV VACC RECOMBINANT IM: CPT | Performed by: FAMILY MEDICINE

## 2025-04-01 PROCEDURE — G8417 CALC BMI ABV UP PARAM F/U: HCPCS | Performed by: FAMILY MEDICINE

## 2025-04-01 PROCEDURE — 1090F PRES/ABSN URINE INCON ASSESS: CPT | Performed by: FAMILY MEDICINE

## 2025-04-01 PROCEDURE — 1036F TOBACCO NON-USER: CPT | Performed by: FAMILY MEDICINE

## 2025-04-01 PROCEDURE — 99214 OFFICE O/P EST MOD 30 MIN: CPT | Performed by: FAMILY MEDICINE

## 2025-04-01 PROCEDURE — G8400 PT W/DXA NO RESULTS DOC: HCPCS | Performed by: FAMILY MEDICINE

## 2025-04-01 PROCEDURE — 1159F MED LIST DOCD IN RCRD: CPT | Performed by: FAMILY MEDICINE

## 2025-04-01 PROCEDURE — 1123F ACP DISCUSS/DSCN MKR DOCD: CPT | Performed by: FAMILY MEDICINE

## 2025-04-01 PROCEDURE — G8427 DOCREV CUR MEDS BY ELIG CLIN: HCPCS | Performed by: FAMILY MEDICINE

## 2025-04-01 RX ORDER — MULTIVIT WITH MINERALS/LUTEIN
1000 TABLET ORAL DAILY
COMMUNITY

## 2025-04-01 RX ORDER — GINSENG 100 MG
50 CAPSULE ORAL DAILY
COMMUNITY

## 2025-04-01 SDOH — ECONOMIC STABILITY: FOOD INSECURITY: WITHIN THE PAST 12 MONTHS, YOU WORRIED THAT YOUR FOOD WOULD RUN OUT BEFORE YOU GOT MONEY TO BUY MORE.: NEVER TRUE

## 2025-04-01 SDOH — ECONOMIC STABILITY: FOOD INSECURITY: WITHIN THE PAST 12 MONTHS, THE FOOD YOU BOUGHT JUST DIDN'T LAST AND YOU DIDN'T HAVE MONEY TO GET MORE.: NEVER TRUE

## 2025-04-01 ASSESSMENT — ENCOUNTER SYMPTOMS
NAUSEA: 0
ABDOMINAL PAIN: 0
EYES NEGATIVE: 1
DIARRHEA: 0
SHORTNESS OF BREATH: 0
BLOOD IN STOOL: 0
VOMITING: 0

## 2025-04-01 ASSESSMENT — PATIENT HEALTH QUESTIONNAIRE - PHQ9
2. FEELING DOWN, DEPRESSED OR HOPELESS: NOT AT ALL
1. LITTLE INTEREST OR PLEASURE IN DOING THINGS: NOT AT ALL
SUM OF ALL RESPONSES TO PHQ QUESTIONS 1-9: 0

## 2025-04-01 NOTE — PROGRESS NOTES
Immunizations Administered       Name Date Dose Route    Zoster Recombinant (Shingrix) 4/1/2025 0.5 mL Intramuscular    Site: Deltoid- Right    Lot: [id number]    NDC: 42145-758-19        After obtaining consent, and per orders of Dr. Nugent, the above injection was given by GRACE MEDEIROS CMA ([facility]). Patient tolerated well. ABN signed.

## 2025-04-01 NOTE — PROGRESS NOTES
2025      Yoselin Garcia (:  1950) is a 75 y.o. female,Established patient, here for evaluation of the following chief complaint(s):  6 Month Follow-Up (Here today for 6 month f/up for HTN and hyperlipidemia. ) and [facility] f/up (Seen 3/27/25 for sinobronchitis, c/o cough and ear itching. )        ASSESSMENT/PLAN     1. Primary hypertension  -     CBC with Auto Differential; Future  -     Lipid Panel; Future  -     Comprehensive Metabolic Panel; Future  2. Other hyperlipidemia  -     Lipid Panel; Future  -     Comprehensive Metabolic Panel; Future  3. Class 1 obesity due to excess calories with serious comorbidity and body mass index (BMI) of 30.0 to 30.9 in adult  4. Need for shingles vaccine  -     Zoster, SHINGRIX, (50 yrs +), IM     Continue lisinopril 5 mg twice daily and Toprol-XL 25 mg daily for hypertension.  Her systolic blood pressure is high today and the patient declines a change in her blood pressure medication, as she prefers to address this with her cardiologist at her upcoming appointment.  For hypertension is a chronic condition that is currently uncontrolled.    Continue simvastatin 20 mg nightly for hyperlipidemia.  This chronic condition is stable and well-controlled.    The patient will continue to work on a low-fat, calorie stricter diet and regular physical activity in order to reduce weight    Shingrix No. 2 today    Patient will follow-up with her specialist as planned    Labs as above before next visit    Return in about 6 months (around 10/1/2025) for AWV.    SUBJECTIVE     Yoselin Garcia is a 75 y.o.female      Here for follow up of chronic health problems including:    Patient Active Problem List   Diagnosis    Primary hypertension    Other hyperlipidemia    Class 1 obesity due to excess calories with serious comorbidity and body mass index (BMI) of 32.0 to 32.9 in adult         History of Present Illness  The patient presents for evaluation of

## 2025-04-02 ENCOUNTER — HOSPITAL ENCOUNTER (OUTPATIENT)
Dept: NUCLEAR MEDICINE | Age: 75
Discharge: HOME | End: 2025-04-02
Attending: NUCLEAR MEDICINE
Payer: MEDICARE

## 2025-04-02 ENCOUNTER — HOSPITAL ENCOUNTER (OUTPATIENT)
Age: 75
Discharge: HOME | End: 2025-04-04
Attending: NUCLEAR MEDICINE
Payer: MEDICARE

## 2025-04-02 VITALS
DIASTOLIC BLOOD PRESSURE: 82 MMHG | WEIGHT: 164 LBS | BODY MASS INDEX: 30.96 KG/M2 | SYSTOLIC BLOOD PRESSURE: 166 MMHG | HEIGHT: 61 IN

## 2025-04-02 DIAGNOSIS — I25.10 CORONARY ARTERY DISEASE INVOLVING NATIVE CORONARY ARTERY OF NATIVE HEART WITHOUT ANGINA PECTORIS: ICD-10-CM

## 2025-04-02 DIAGNOSIS — R06.09 DYSPNEA ON EXERTION: ICD-10-CM

## 2025-04-02 DIAGNOSIS — I10 PRIMARY HYPERTENSION: ICD-10-CM

## 2025-04-02 DIAGNOSIS — I35.1 NONRHEUMATIC AORTIC VALVE INSUFFICIENCY: ICD-10-CM

## 2025-04-02 LAB
ECHO AO ASC DIAM: 2.9 CM
ECHO AO ASCENDING AORTA INDEX: 1.67 CM/M2
ECHO AR MAX VEL PISA: 3.8 M/S
ECHO AV CUSP MM: 2 CM
ECHO AV MEAN GRADIENT: 3 MMHG
ECHO AV MEAN VELOCITY: 0.8 M/S
ECHO AV PEAK GRADIENT: 5 MMHG
ECHO AV PEAK VELOCITY: 1.1 M/S
ECHO AV REGURGITANT PHT: 459 MS
ECHO AV VELOCITY RATIO: 0.64
ECHO AV VTI: 24.2 CM
ECHO BSA: 1.79 M2
ECHO BSA: 1.79 M2
ECHO LA AREA 2C: 10.6 CM2
ECHO LA AREA 4C: 11.2 CM2
ECHO LA DIAMETER INDEX: 1.67 CM/M2
ECHO LA DIAMETER: 2.9 CM
ECHO LA MAJOR AXIS: 4.1 CM
ECHO LA MINOR AXIS: 3.9 CM
ECHO LA VOL BP: 25 ML (ref 22–52)
ECHO LA VOL MOD A2C: 24 ML (ref 22–52)
ECHO LA VOL MOD A4C: 25 ML (ref 22–52)
ECHO LA VOL/BSA BIPLANE: 14 ML/M2 (ref 16–34)
ECHO LA VOLUME INDEX MOD A2C: 14 ML/M2 (ref 16–34)
ECHO LA VOLUME INDEX MOD A4C: 14 ML/M2 (ref 16–34)
ECHO LV E' LATERAL VELOCITY: 6.1 CM/S
ECHO LV E' SEPTAL VELOCITY: 4.1 CM/S
ECHO LV EDV A2C: 105 ML
ECHO LV EDV A4C: 109 ML
ECHO LV EDV INDEX A4C: 63 ML/M2
ECHO LV EDV NDEX A2C: 60 ML/M2
ECHO LV EJECTION FRACTION 3D: 49 %
ECHO LV EJECTION FRACTION A2C: 55 %
ECHO LV EJECTION FRACTION A4C: 41 %
ECHO LV EJECTION FRACTION BIPLANE: 49 % (ref 55–100)
ECHO LV ESV A2C: 47 ML
ECHO LV ESV A4C: 65 ML
ECHO LV ESV INDEX A2C: 27 ML/M2
ECHO LV ESV INDEX A4C: 37 ML/M2
ECHO LV FRACTIONAL SHORTENING: 22 % (ref 28–44)
ECHO LV INTERNAL DIMENSION DIASTOLE INDEX: 2.82 CM/M2
ECHO LV INTERNAL DIMENSION DIASTOLIC: 4.9 CM (ref 3.9–5.3)
ECHO LV INTERNAL DIMENSION SYSTOLIC INDEX: 2.18 CM/M2
ECHO LV INTERNAL DIMENSION SYSTOLIC: 3.8 CM
ECHO LV ISOVOLUMETRIC RELAXATION TIME (IVRT): 63 MS
ECHO LV IVSD: 0.9 CM (ref 0.6–0.9)
ECHO LV MASS 2D: 141.9 G (ref 67–162)
ECHO LV MASS INDEX 2D: 81.6 G/M2 (ref 43–95)
ECHO LV POSTERIOR WALL DIASTOLIC: 0.8 CM (ref 0.6–0.9)
ECHO LV RELATIVE WALL THICKNESS RATIO: 0.33
ECHO LVOT AV VTI INDEX: 0.77
ECHO LVOT MEAN GRADIENT: 1 MMHG
ECHO LVOT PEAK GRADIENT: 2 MMHG
ECHO LVOT PEAK VELOCITY: 0.7 M/S
ECHO LVOT VTI: 18.7 CM
ECHO MV A VELOCITY: 0.71 M/S
ECHO MV E DECELERATION TIME (DT): 282 MS
ECHO MV E VELOCITY: 0.56 M/S
ECHO MV E/A RATIO: 0.79
ECHO MV E/E' LATERAL: 9.18
ECHO MV E/E' RATIO (AVERAGED): 11.42
ECHO MV E/E' SEPTAL: 13.66
ECHO MV REGURGITANT PEAK GRADIENT: 71 MMHG
ECHO MV REGURGITANT PEAK VELOCITY: 4.2 M/S
ECHO PULMONARY ARTERY END DIASTOLIC PRESSURE: 3 MMHG
ECHO PV MAX VELOCITY: 0.5 M/S
ECHO PV PEAK GRADIENT: 1 MMHG
ECHO PV REGURGITANT MAX VELOCITY: 0.9 M/S
ECHO RV INTERNAL DIMENSION: 2.9 CM
ECHO RV TAPSE: 1.4 CM (ref 1.7–?)
ECHO TV E WAVE: 0.4 M/S
ECHO TV REGURGITANT MAX VELOCITY: 2.31 M/S
ECHO TV REGURGITANT PEAK GRADIENT: 21 MMHG
NUC STRESS EJECTION FRACTION: 51 %
STRESS BASELINE DIAS BP: 59 MMHG
STRESS BASELINE HR: 70 BPM
STRESS BASELINE SYS BP: 153 MMHG
STRESS ESTIMATED WORKLOAD: 1 METS
STRESS PEAK DIAS BP: 60 MMHG
STRESS PEAK SYS BP: 163 MMHG
STRESS PERCENT HR ACHIEVED: 71 %
STRESS POST PEAK HR: 101 BPM
STRESS RATE PRESSURE PRODUCT: NORMAL BPM*MMHG
STRESS STAGE 1 BP: NORMAL MMHG
STRESS STAGE 1 DURATION: 1 MIN:SEC
STRESS STAGE 1 HR: 93 BPM
STRESS STAGE 2 BP: NORMAL MMHG
STRESS STAGE 2 DURATION: 1 MIN:SEC
STRESS STAGE 2 HR: 97 BPM
STRESS STAGE 3 BP: NORMAL MMHG
STRESS STAGE 3 DURATION: 1 MIN:SEC
STRESS STAGE 3 HR: 90 BPM
STRESS STAGE RECOVERY 1 BP: NORMAL MMHG
STRESS STAGE RECOVERY 1 DURATION: 1 MIN:SEC
STRESS STAGE RECOVERY 1 HR: 87 BPM
STRESS STAGE RECOVERY 2 BP: NORMAL MMHG
STRESS STAGE RECOVERY 2 DURATION: 1 MIN:SEC
STRESS STAGE RECOVERY 2 HR: 86 BPM
STRESS STAGE RECOVERY 3 BP: NORMAL MMHG
STRESS STAGE RECOVERY 3 DURATION: 1 MIN:SEC
STRESS STAGE RECOVERY 3 HR: 85 BPM
STRESS STAGE RECOVERY 4 BP: NORMAL MMHG
STRESS STAGE RECOVERY 4 DURATION: 2 MIN:SEC
STRESS STAGE RECOVERY 4 HR: 82 BPM
STRESS TARGET HR: 143 BPM

## 2025-04-02 PROCEDURE — A9500 TC99M SESTAMIBI: HCPCS | Performed by: NUCLEAR MEDICINE

## 2025-04-02 PROCEDURE — 6360000004 HC RX CONTRAST MEDICATION: Performed by: NUCLEAR MEDICINE

## 2025-04-02 PROCEDURE — 3430000000 HC RX DIAGNOSTIC RADIOPHARMACEUTICAL: Performed by: NUCLEAR MEDICINE

## 2025-04-02 PROCEDURE — C8929 TTE W OR WO FOL WCON,DOPPLER: HCPCS

## 2025-04-02 PROCEDURE — 6360000002 HC RX W HCPCS: Performed by: NUCLEAR MEDICINE

## 2025-04-02 PROCEDURE — 93017 CV STRESS TEST TRACING ONLY: CPT

## 2025-04-02 PROCEDURE — 78452 HT MUSCLE IMAGE SPECT MULT: CPT

## 2025-04-02 RX ORDER — REGADENOSON 0.08 MG/ML
0.4 INJECTION, SOLUTION INTRAVENOUS
Status: COMPLETED | OUTPATIENT
Start: 2025-04-02 | End: 2025-04-02

## 2025-04-02 RX ORDER — TETRAKIS(2-METHOXYISOBUTYLISOCYANIDE)COPPER(I) TETRAFLUOROBORATE 1 MG/ML
9.7 INJECTION, POWDER, LYOPHILIZED, FOR SOLUTION INTRAVENOUS
Status: COMPLETED | OUTPATIENT
Start: 2025-04-02 | End: 2025-04-02

## 2025-04-02 RX ORDER — TETRAKIS(2-METHOXYISOBUTYLISOCYANIDE)COPPER(I) TETRAFLUOROBORATE 1 MG/ML
32 INJECTION, POWDER, LYOPHILIZED, FOR SOLUTION INTRAVENOUS
Status: COMPLETED | OUTPATIENT
Start: 2025-04-02 | End: 2025-04-02

## 2025-04-02 RX ADMIN — Medication 32 MILLICURIE: at 10:39

## 2025-04-02 RX ADMIN — SULFUR HEXAFLUORIDE 2 ML: KIT at 09:28

## 2025-04-02 RX ADMIN — REGADENOSON 0.4 MG: 0.08 INJECTION, SOLUTION INTRAVENOUS at 10:37

## 2025-04-02 RX ADMIN — Medication 9.7 MILLICURIE: at 09:40

## 2025-04-09 RX ORDER — METOPROLOL SUCCINATE 25 MG/1
25 TABLET, EXTENDED RELEASE ORAL DAILY
Qty: 90 TABLET | Refills: 3 | Status: SHIPPED | OUTPATIENT
Start: 2025-04-09

## 2025-04-09 RX ORDER — LISINOPRIL 5 MG/1
5 TABLET ORAL 2 TIMES DAILY
Qty: 180 TABLET | Refills: 3 | Status: SHIPPED | OUTPATIENT
Start: 2025-04-09

## 2025-04-30 ENCOUNTER — OFFICE VISIT (OUTPATIENT)
Dept: ENT CLINIC | Age: 75
End: 2025-04-30
Payer: MEDICARE

## 2025-04-30 VITALS
TEMPERATURE: 97.6 F | DIASTOLIC BLOOD PRESSURE: 72 MMHG | OXYGEN SATURATION: 95 % | BODY MASS INDEX: 31.08 KG/M2 | HEIGHT: 61 IN | SYSTOLIC BLOOD PRESSURE: 118 MMHG | RESPIRATION RATE: 18 BRPM | WEIGHT: 164.6 LBS | HEART RATE: 72 BPM

## 2025-04-30 DIAGNOSIS — J30.0 VASOMOTOR RHINITIS: ICD-10-CM

## 2025-04-30 DIAGNOSIS — J34.89 CONCHA BULLOSA: ICD-10-CM

## 2025-04-30 DIAGNOSIS — J34.829 NASAL VALVE COLLAPSE: Primary | ICD-10-CM

## 2025-04-30 DIAGNOSIS — R09.81 NASAL CONGESTION: ICD-10-CM

## 2025-04-30 PROCEDURE — 99204 OFFICE O/P NEW MOD 45 MIN: CPT

## 2025-04-30 PROCEDURE — G8400 PT W/DXA NO RESULTS DOC: HCPCS

## 2025-04-30 PROCEDURE — 3078F DIAST BP <80 MM HG: CPT

## 2025-04-30 PROCEDURE — 3074F SYST BP LT 130 MM HG: CPT

## 2025-04-30 PROCEDURE — 1123F ACP DISCUSS/DSCN MKR DOCD: CPT

## 2025-04-30 PROCEDURE — 1160F RVW MEDS BY RX/DR IN RCRD: CPT

## 2025-04-30 PROCEDURE — 1036F TOBACCO NON-USER: CPT

## 2025-04-30 PROCEDURE — 1090F PRES/ABSN URINE INCON ASSESS: CPT

## 2025-04-30 PROCEDURE — G8417 CALC BMI ABV UP PARAM F/U: HCPCS

## 2025-04-30 PROCEDURE — 1159F MED LIST DOCD IN RCRD: CPT

## 2025-04-30 PROCEDURE — G8427 DOCREV CUR MEDS BY ELIG CLIN: HCPCS

## 2025-04-30 RX ORDER — LORATADINE 10 MG/1
10 TABLET ORAL DAILY
Qty: 30 TABLET | Refills: 2 | Status: SHIPPED | OUTPATIENT
Start: 2025-04-30

## 2025-04-30 RX ORDER — IPRATROPIUM BROMIDE 21 UG/1
2 SPRAY, METERED NASAL 3 TIMES DAILY
Qty: 30 ML | Refills: 4 | Status: SHIPPED | OUTPATIENT
Start: 2025-04-30

## 2025-04-30 NOTE — PROGRESS NOTES
[facility] [place] [facility]  [facility] EAR, NOSE AND THROAT  [address]  [address]  [facility] [address]  Dept: [phone]  Dept Fax: [phone]  Loc: [phone]    Yoselin Garcia is a 75 y.o. female who was referred by No ref. provider found and I reviewed their note for:  Chief Complaint   Patient presents with    Other     Patient is here today for a 1 year f/u. Patient states that the meds Johan prescribed have really helped and she said she would like a refill on those meds. She states the only concern she has is that she feels like she can't take deep breathes and that something feels restricted in her nasal area.   .     HPI:     Yoselin Garcia is a 75 y.o. female presenting for follow up on nasal congestion. She states that Atrovent, nasal saline rinse irrigations,and nasogel seemed to help many of her symptoms. However, she feels as if she is not able to take a dep breath through her nose without it blocking off. Generally when she breaths normally this is not bothersome.  She denies nasla drainage, recurrent sinusitis, head pressure.     Previous visit documentation 04/02/2024:    Yoselin Garcia presents today for follow-up regarding nasal drainage.  She reports that she has found the Atrovent nasal spray to be helpful.  She states she is only used it about once every 1-3 days as she is trying to avoid overuse, but has seem to positively impact the amount of rhinorrhea she has.  She does report some occasional pressure through the maxilla bilaterally.  She states that she breathes okay through her nose and denies any current/daily congestion.  She does report that occasionally her nose feels plugged with thick, tacky mucous.  She has tried the [place] pot intermittently in the past, but has not used it recently.     Previous visit documentation 1/22/24: Yoselin Garcia presents today for evaluation of nasal drainage. She reports thin, clear

## 2025-07-02 NOTE — PROGRESS NOTES
[facility] [place] [facility]  [facility] EAR, NOSE AND THROAT  [address]  [address]  [facility] [address]  Dept: [phone]  Dept Fax: [phone]  Loc: [phone]    Yoselin Garcia is a 75 y.o. female who was referred by No ref. provider found and I reviewed their note for:  Chief Complaint   Patient presents with    Follow-up     Patient here for 2 month f/u for nasal check,nasal valve collapsing. Patient stated that the pills she was prescribed after last visit did not help.   .     HPI:     Yoselin Garcia is a 75 y.o. 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 We dsicussed trial of breath rite strips and consideration of nasal valve stenting in OR. Since last visit pt notes that symptoms are the same on an allergy regimen. She still endorses that whenever she breaths in deeply her airflow stops and she struggled to breath through her nose. She endorses that her chronic vasomotor rhinitis is controlled on Atrovent and she denies recurrent sinusitis.    Previous visit 4/30/2025:  Yoselin Garcia is a 75 y.o. female presenting for follow up on nasal congestion. She states that Atrovent, nasal saline rinse irrigations,and nasogel seemed to help many of her symptoms. However, she feels as if she is not able to take a dep breath through her nose without it blocking off. Generally when she breaths normally this is not bothersome.  She denies nasla drainage, recurrent sinusitis, head pressure.      Previous visit documentation 04/02/2024:    Yoselin Garcia presents today for follow-up regarding nasal drainage.  She reports that she has found the Atrovent nasal spray to be helpful.  She states she is only used it about once every 1-3 days as she is trying to avoid overuse, but has seem to positively impact the amount of rhinorrhea she has.  She does report some occasional pressure through the maxilla

## 2025-07-03 ENCOUNTER — PREP FOR PROCEDURE (OUTPATIENT)
Dept: ENT CLINIC | Age: 75
End: 2025-07-03

## 2025-07-03 ENCOUNTER — TELEPHONE (OUTPATIENT)
Dept: CARDIOLOGY CLINIC | Age: 75
End: 2025-07-03

## 2025-07-03 ENCOUNTER — OFFICE VISIT (OUTPATIENT)
Dept: ENT CLINIC | Age: 75
End: 2025-07-03
Payer: MEDICARE

## 2025-07-03 VITALS
SYSTOLIC BLOOD PRESSURE: 130 MMHG | RESPIRATION RATE: 16 BRPM | TEMPERATURE: 97.2 F | HEART RATE: 68 BPM | BODY MASS INDEX: 31.43 KG/M2 | WEIGHT: 166.5 LBS | DIASTOLIC BLOOD PRESSURE: 78 MMHG | OXYGEN SATURATION: 98 % | HEIGHT: 61 IN

## 2025-07-03 DIAGNOSIS — J34.829 NASAL VALVE COLLAPSE: Primary | ICD-10-CM

## 2025-07-03 DIAGNOSIS — J34.89 CONCHA BULLOSA: ICD-10-CM

## 2025-07-03 DIAGNOSIS — J34.829 NASAL VALVE COLLAPSE: ICD-10-CM

## 2025-07-03 DIAGNOSIS — R09.81 NASAL CONGESTION: ICD-10-CM

## 2025-07-03 DIAGNOSIS — Z01.818 PREOP TESTING: Primary | ICD-10-CM

## 2025-07-03 DIAGNOSIS — J30.0 VASOMOTOR RHINITIS: ICD-10-CM

## 2025-07-03 PROCEDURE — 1159F MED LIST DOCD IN RCRD: CPT

## 2025-07-03 PROCEDURE — 1123F ACP DISCUSS/DSCN MKR DOCD: CPT

## 2025-07-03 PROCEDURE — 1160F RVW MEDS BY RX/DR IN RCRD: CPT

## 2025-07-03 PROCEDURE — 1036F TOBACCO NON-USER: CPT

## 2025-07-03 PROCEDURE — G8400 PT W/DXA NO RESULTS DOC: HCPCS

## 2025-07-03 PROCEDURE — 1090F PRES/ABSN URINE INCON ASSESS: CPT

## 2025-07-03 PROCEDURE — G8417 CALC BMI ABV UP PARAM F/U: HCPCS

## 2025-07-03 PROCEDURE — 3078F DIAST BP <80 MM HG: CPT

## 2025-07-03 PROCEDURE — 99213 OFFICE O/P EST LOW 20 MIN: CPT

## 2025-07-03 PROCEDURE — G8427 DOCREV CUR MEDS BY ELIG CLIN: HCPCS

## 2025-07-03 PROCEDURE — 3075F SYST BP GE 130 - 139MM HG: CPT

## 2025-07-03 NOTE — TELEPHONE ENCOUNTER
Patient is being scheduled for a procedure with Dr. Everett and we are requesting clearance from Dr. Adams.    DOS: 09/15/2025  Procedure: Bilateral nasal valve collapse repair and right bia bullosa reduction  Meds to hold:  Multi vitamin, Vitamin C x 1 week. Lisinopril the morning of surgery.    Please advise.  Thank you!

## 2025-07-03 NOTE — TELEPHONE ENCOUNTER
Pre op Risk Assessment    Procedure  Bilateral nasal valve collapse repair and right bia bullosa reduction   Physician Dr. Everett  Date of surgery/procedure 09/15/25    Last OV 02/28/25  Last Stress 04/02/25  Last Echo 04/02/25  Last Cath 11/22/21  Last Stent ??  Is patient on blood thinners No  Hold Meds/how many days NA

## 2025-07-17 ENCOUNTER — HOSPITAL ENCOUNTER (OUTPATIENT)
Age: 75
Discharge: HOME | End: 2025-07-17
Payer: MEDICARE

## 2025-07-17 DIAGNOSIS — Z01.818 PREOP TESTING: ICD-10-CM

## 2025-07-17 LAB
ALBUMIN SERPL BCG-MCNC: 3.8 G/DL (ref 3.4–4.9)
ALP SERPL-CCNC: 49 U/L (ref 38–126)
ALT SERPL W/O P-5'-P-CCNC: 16 U/L (ref 10–35)
ANION GAP SERPL CALC-SCNC: 12 MEQ/L (ref 8–16)
AST SERPL-CCNC: 21 U/L (ref 10–35)
BASOPHILS ABSOLUTE: 0.1 THOU/MM3 (ref 0–0.1)
BASOPHILS NFR BLD AUTO: 0.9 %
BILIRUB SERPL-MCNC: 0.4 MG/DL (ref 0.3–1.2)
BUN SERPL-MCNC: 8 MG/DL (ref 8–23)
CALCIUM SERPL-MCNC: 9.3 MG/DL (ref 8.8–10.2)
CHLORIDE SERPL-SCNC: 102 MEQ/L (ref 98–111)
CO2 SERPL-SCNC: 25 MEQ/L (ref 22–29)
CREAT SERPL-MCNC: 0.6 MG/DL (ref 0.5–0.9)
DEPRECATED RDW RBC AUTO: 47.2 FL (ref 35–45)
EOSINOPHIL NFR BLD AUTO: 1.8 %
EOSINOPHILS ABSOLUTE: 0.1 THOU/MM3 (ref 0–0.4)
ERYTHROCYTE [DISTWIDTH] IN BLOOD BY AUTOMATED COUNT: 13.3 % (ref 11.5–14.5)
GFR SERPL CREATININE-BSD FRML MDRD: > 90 ML/MIN/1.73M2
GLUCOSE SERPL-MCNC: 93 MG/DL (ref 74–109)
HCT VFR BLD AUTO: 43.7 % (ref 37–47)
HGB BLD-MCNC: 13.9 GM/DL (ref 12–16)
IMM GRANULOCYTES # BLD AUTO: 0.01 THOU/MM3 (ref 0–0.07)
IMM GRANULOCYTES NFR BLD AUTO: 0.2 %
LYMPHOCYTES ABSOLUTE: 1.7 THOU/MM3 (ref 1–4.8)
LYMPHOCYTES NFR BLD AUTO: 30.3 %
MCH RBC QN AUTO: 30.5 PG (ref 26–33)
MCHC RBC AUTO-ENTMCNC: 31.8 GM/DL (ref 32.2–35.5)
MCV RBC AUTO: 95.8 FL (ref 81–99)
MONOCYTES ABSOLUTE: 0.4 THOU/MM3 (ref 0.4–1.3)
MONOCYTES NFR BLD AUTO: 7.2 %
NEUTROPHILS ABSOLUTE: 3.3 THOU/MM3 (ref 1.8–7.7)
NEUTROPHILS NFR BLD AUTO: 59.6 %
NRBC BLD AUTO-RTO: 0 /100 WBC
PLATELET # BLD AUTO: 192 THOU/MM3 (ref 130–400)
PMV BLD AUTO: 11.3 FL (ref 9.4–12.4)
POTASSIUM SERPL-SCNC: 4.5 MEQ/L (ref 3.5–5.2)
PROT SERPL-MCNC: 6.1 G/DL (ref 6.4–8.3)
RBC # BLD AUTO: 4.56 MILL/MM3 (ref 4.2–5.4)
SODIUM SERPL-SCNC: 139 MEQ/L (ref 135–145)
WBC # BLD AUTO: 5.6 THOU/MM3 (ref 4.8–10.8)

## 2025-07-17 PROCEDURE — 36415 COLL VENOUS BLD VENIPUNCTURE: CPT

## 2025-07-17 PROCEDURE — 85025 COMPLETE CBC W/AUTO DIFF WBC: CPT

## 2025-07-17 PROCEDURE — 80053 COMPREHEN METABOLIC PANEL: CPT

## 2025-07-31 RX ORDER — SIMVASTATIN 20 MG
20 TABLET ORAL NIGHTLY
Qty: 90 TABLET | Refills: 0 | OUTPATIENT
Start: 2025-07-31

## 2025-07-31 RX ORDER — SIMVASTATIN 20 MG
20 TABLET ORAL NIGHTLY
Qty: 90 TABLET | Refills: 3 | Status: SHIPPED | OUTPATIENT
Start: 2025-07-31

## 2025-07-31 NOTE — TELEPHONE ENCOUNTER
Rx EP'd to pharmacy.  Please notify patient.      Requested Prescriptions     Signed Prescriptions Disp Refills    simvastatin (ZOCOR) 20 MG tablet 90 tablet 3     Sig: Take 1 tablet by mouth nightly     Authorizing Provider: SANDIP NUGENT           Electronically signed by Sandip Nugent MD on 7/31/2025 at 11:46 AM

## 2025-07-31 NOTE — TELEPHONE ENCOUNTER
This medication refill is regarding a MyChart request. Refill requested by patient.    Requested Prescriptions     Pending Prescriptions Disp Refills    simvastatin (ZOCOR) 20 MG tablet 90 tablet 3     Sig: Take 1 tablet by mouth nightly     Date of last visit: 4/1/2025   Date of next visit: 10/3/2025  Date of last refill:  07/23/2024   Pharmacy Name:  [address] [place] [address]     Last Lipid Panel:    Lab Results   Component Value Date/Time    CHOL 184 09/13/2024 07:38 AM    TRIG 134 09/13/2024 07:38 AM    HDL 57 09/13/2024 07:38 AM     Last CMP:   Lab Results   Component Value Date     07/17/2025    K 4.5 07/17/2025     07/17/2025    CO2 25 07/17/2025    BUN 8 07/17/2025    CREATININE 0.6 07/17/2025    GLUCOSE 93 07/17/2025    CALCIUM 9.3 07/17/2025    BILITOT 0.4 07/17/2025    ALKPHOS 49 07/17/2025    AST 21 07/17/2025    ALT 16 07/17/2025    LABGLOM > 90 07/17/2025       Last Thyroid:  No results found for: \"TSH\", \"T7ARYLV\", \"THYROIDAB\", \"FT3\", \"T4FREE\"  Last Hemoglobin A1C:  No results found for: \"LABA1C\"    Rx verified, ordered and set to EP.

## 2025-08-21 ENCOUNTER — OFFICE VISIT (OUTPATIENT)
Dept: ENT CLINIC | Age: 75
End: 2025-08-21
Payer: MEDICARE

## 2025-08-21 VITALS
HEART RATE: 70 BPM | SYSTOLIC BLOOD PRESSURE: 136 MMHG | DIASTOLIC BLOOD PRESSURE: 76 MMHG | OXYGEN SATURATION: 96 % | WEIGHT: 164.2 LBS | BODY MASS INDEX: 31 KG/M2 | HEIGHT: 61 IN | TEMPERATURE: 98.4 F

## 2025-08-21 DIAGNOSIS — J34.829 NASAL VALVE COLLAPSE: ICD-10-CM

## 2025-08-21 DIAGNOSIS — J34.3 HYPERTROPHY OF BOTH INFERIOR NASAL TURBINATES: Primary | ICD-10-CM

## 2025-08-21 DIAGNOSIS — J30.0 VASOMOTOR RHINITIS: ICD-10-CM

## 2025-08-21 DIAGNOSIS — R09.81 NASAL CONGESTION: ICD-10-CM

## 2025-08-21 PROCEDURE — 1160F RVW MEDS BY RX/DR IN RCRD: CPT | Performed by: OTOLARYNGOLOGY

## 2025-08-21 PROCEDURE — 1123F ACP DISCUSS/DSCN MKR DOCD: CPT | Performed by: OTOLARYNGOLOGY

## 2025-08-21 PROCEDURE — 1036F TOBACCO NON-USER: CPT | Performed by: OTOLARYNGOLOGY

## 2025-08-21 PROCEDURE — G8427 DOCREV CUR MEDS BY ELIG CLIN: HCPCS | Performed by: OTOLARYNGOLOGY

## 2025-08-21 PROCEDURE — 1159F MED LIST DOCD IN RCRD: CPT | Performed by: OTOLARYNGOLOGY

## 2025-08-21 PROCEDURE — 3078F DIAST BP <80 MM HG: CPT | Performed by: OTOLARYNGOLOGY

## 2025-08-21 PROCEDURE — 1090F PRES/ABSN URINE INCON ASSESS: CPT | Performed by: OTOLARYNGOLOGY

## 2025-08-21 PROCEDURE — 99213 OFFICE O/P EST LOW 20 MIN: CPT | Performed by: OTOLARYNGOLOGY

## 2025-08-21 PROCEDURE — G8417 CALC BMI ABV UP PARAM F/U: HCPCS | Performed by: OTOLARYNGOLOGY

## 2025-08-21 PROCEDURE — 31231 NASAL ENDOSCOPY DX: CPT | Performed by: OTOLARYNGOLOGY

## 2025-08-21 PROCEDURE — 3075F SYST BP GE 130 - 139MM HG: CPT | Performed by: OTOLARYNGOLOGY

## 2025-08-21 PROCEDURE — G8400 PT W/DXA NO RESULTS DOC: HCPCS | Performed by: OTOLARYNGOLOGY

## (undated) DEVICE — NEEDLE HYPO 27GA L1.25IN GRY POLYPR HUB S STL REG BVL STR

## (undated) DEVICE — COTTON BALL ST

## (undated) DEVICE — GARMENT,MEDLINE,DVT,INT,CALF,MED, GEN2: Brand: MEDLINE

## (undated) DEVICE — STRIP,CLOSURE,WOUND,MEDI-STRIP,1/2X4: Brand: MEDLINE

## (undated) DEVICE — CHLORAPREP 26ML CLEAR

## (undated) DEVICE — 3M™ STERI-STRIP™ COMPOUND BENZOIN TINCTURE 40 BAGS/CARTON 4 CARTONS/CASE C1544: Brand: 3M™ STERI-STRIP™

## (undated) DEVICE — BANDAGE COMPR M W4INXL10YD WHT BGE VELC E MTRX HK AND LOOP

## (undated) DEVICE — SUTURE NONABSORBABLE BRAIDED 4-0 SH 30 IN BLK PERMA HND K831H

## (undated) DEVICE — GLOVE SURG SZ 8 L11.77IN FNGR THK9.8MIL STRW LTX POLYMER

## (undated) DEVICE — GLOVE ORANGE PI 7   MSG9070

## (undated) DEVICE — BANDAGE,GAUZE,4.5"X4.1YD,STERILE,LF: Brand: MEDLINE

## (undated) DEVICE — SUTURE MCRYL SZ 4-0 L18IN ABSRB UD P-3 L13MM 3/8 CIR PRIM Y494G

## (undated) DEVICE — PACK PROCEDURE SURG PLAS SC MIN SRHP LF

## (undated) DEVICE — GOWN,SIRUS,NON REINFRCD,LARGE,SET IN SL: Brand: MEDLINE